# Patient Record
Sex: MALE | Race: WHITE | NOT HISPANIC OR LATINO | Employment: FULL TIME | URBAN - METROPOLITAN AREA
[De-identification: names, ages, dates, MRNs, and addresses within clinical notes are randomized per-mention and may not be internally consistent; named-entity substitution may affect disease eponyms.]

---

## 2017-11-07 ENCOUNTER — OFFICE VISIT (OUTPATIENT)
Dept: MEDICAL GROUP | Facility: PHYSICIAN GROUP | Age: 27
End: 2017-11-07
Payer: COMMERCIAL

## 2017-11-07 VITALS
HEIGHT: 73 IN | SYSTOLIC BLOOD PRESSURE: 116 MMHG | OXYGEN SATURATION: 96 % | WEIGHT: 230 LBS | RESPIRATION RATE: 16 BRPM | BODY MASS INDEX: 30.48 KG/M2 | HEART RATE: 64 BPM | DIASTOLIC BLOOD PRESSURE: 70 MMHG | TEMPERATURE: 98.8 F

## 2017-11-07 DIAGNOSIS — J34.89 NASAL DISCHARGE: ICD-10-CM

## 2017-11-07 DIAGNOSIS — J34.9 SINUS DISEASE: ICD-10-CM

## 2017-11-07 DIAGNOSIS — A63.0 GENITAL WARTS: ICD-10-CM

## 2017-11-07 DIAGNOSIS — F45.8 BRUXISM: ICD-10-CM

## 2017-11-07 DIAGNOSIS — E66.9 OBESITY (BMI 30-39.9): ICD-10-CM

## 2017-11-07 DIAGNOSIS — M25.521 RIGHT ELBOW PAIN: ICD-10-CM

## 2017-11-07 DIAGNOSIS — K64.0 GRADE I HEMORRHOIDS: ICD-10-CM

## 2017-11-07 DIAGNOSIS — L30.9 ECZEMA, UNSPECIFIED TYPE: ICD-10-CM

## 2017-11-07 DIAGNOSIS — F98.8 ATTENTION DEFICIT DISORDER, UNSPECIFIED HYPERACTIVITY PRESENCE: ICD-10-CM

## 2017-11-07 PROCEDURE — 99204 OFFICE O/P NEW MOD 45 MIN: CPT | Performed by: INTERNAL MEDICINE

## 2017-11-07 RX ORDER — CYCLOBENZAPRINE HCL 5 MG
5-10 TABLET ORAL 3 TIMES DAILY PRN
Qty: 30 TAB | Refills: 0 | Status: SHIPPED | OUTPATIENT
Start: 2017-11-07 | End: 2018-04-10 | Stop reason: SDUPTHER

## 2017-11-07 RX ORDER — TRIAMCINOLONE ACETONIDE 1 MG/G
1 CREAM TOPICAL 2 TIMES DAILY
Qty: 1 TUBE | Refills: 0 | Status: SHIPPED | OUTPATIENT
Start: 2017-11-07 | End: 2020-12-14

## 2017-11-07 RX ORDER — CYCLOBENZAPRINE HCL 5 MG
5-10 TABLET ORAL 3 TIMES DAILY PRN
Qty: 30 TAB | Refills: 0 | Status: SHIPPED | OUTPATIENT
Start: 2017-11-07 | End: 2017-11-07 | Stop reason: SDUPTHER

## 2017-11-07 RX ORDER — TRIAMCINOLONE ACETONIDE 1 MG/G
1 CREAM TOPICAL 2 TIMES DAILY
Qty: 1 TUBE | Refills: 0 | Status: SHIPPED | OUTPATIENT
Start: 2017-11-07 | End: 2017-11-07 | Stop reason: SDUPTHER

## 2017-11-07 ASSESSMENT — PATIENT HEALTH QUESTIONNAIRE - PHQ9: CLINICAL INTERPRETATION OF PHQ2 SCORE: 0

## 2017-11-09 NOTE — ASSESSMENT & PLAN NOTE
He has severe teeth ground . He has had tried mouth guard, but still bruxism is very severe. He had tried muscle relaxant and seems that's the only thing helps him. Otherwise he has TMJ tenderness and pain

## 2017-11-09 NOTE — ASSESSMENT & PLAN NOTE
He has sometimes anal itchiness, he has not seen blood in stool. He does not feel bulging hemorrhoids. He had tried H cream and seems to help his symptoms

## 2017-11-09 NOTE — ASSESSMENT & PLAN NOTE
He reports pain on the right elbow, that is going on for one month, radiate lateral tendin. He denies fever. He works at . He works in computer all time. He has no swelling. No trauma. He had tried some ibuprofen. Still has tenderness on palpation, or when he works

## 2017-11-09 NOTE — ASSESSMENT & PLAN NOTE
Pt reports feeling congested, nasal drips, crusty nose most of the time. He has seasonal allergies, but sinus symptoms has been worse since last 6 months. He had used cocaine in large amount, and since then he is having above symptoms. He had tried washing sinus, but seems to not resolve the problem. He denies sore throat, headache, blurry vision, earaches, tinnitus, fever

## 2017-11-09 NOTE — ASSESSMENT & PLAN NOTE
He has had genital warts for long time. He has had cryotherapy. Still he has reoccurrence of lesions. He is asking if there is a medication to complete cure them. I explained that there isn't. He has not had any other STD

## 2017-11-09 NOTE — PROGRESS NOTES
New Patient to Establish    Reason to establish: sinus congested, right elbow pain, muscle relaxant     Miky Khanna is a 26 y.o. male here today for evaluation and management of:    Nasal discharge  High dose of coccaie, since then congested, nasal drips, saline washing.     ADD (attention deficit disorder)  He is on vyvanse. Problems more with focusing. Follows with psychiatry and psychologist.       Sinus disease  Pt reports feeling congested, nasal drips, crusty nose most of the time. He has seasonal allergies, but sinus symptoms has been worse since last 6 months. He had used cocaine in large amount, and since then he is having above symptoms. He had tried washing sinus, but seems to not resolve the problem. He denies sore throat, headache, blurry vision, earaches, tinnitus, fever    Right elbow pain  He reports pain on the right elbow, that is going on for one month, radiate lateral tendin. He denies fever. He works at [employer]. He works in computer all time. He has no swelling. No trauma. He had tried some ibuprofen. Still has tenderness on palpation, or when he works     Obesity (BMI 30-39.9)  He has high muscle rate, he is not obese,     Grade I hemorrhoids  He has sometimes anal itchiness, he has not seen blood in stool. He does not feel bulging hemorrhoids. He had tried H cream and seems to help his symptoms     Genital warts  He has had genital warts for long time. He has had cryotherapy. Still he has reoccurrence of lesions. He is asking if there is a medication to complete cure them. I explained that there isn't. He has not had any other STD     Eczema  He has a papula on left arm, itchy, not irritated. eczema type     Bruxism  He has severe teeth ground . He has had tried mouth guard, but still bruxism is very severe. He had tried muscle relaxant and seems that's the only thing helps him. Otherwise he has TMJ tenderness and pain       Past Medical History:   Diagnosis Date   • ADHD    • Anxiety   "  • Substance abuse        Current Outpatient Prescriptions   Medication Sig Dispense Refill   • cyclobenzaprine (FLEXERIL) 5 MG tablet Take 1-2 Tabs by mouth 3 times a day as needed. 30 Tab 0   • triamcinolone acetonide (KENALOG) 0.1 % Cream Apply 1 Application to affected area(s) 2 times a day. 1 Tube 0     No current facility-administered medications for this visit.        Allergies as of 11/07/2017 - Reviewed 11/07/2017   Allergen Reaction Noted   • Tramadol Shortness of Breath 01/27/2015       Social History     Social History   • Marital status: Unknown     Spouse name: N/A   • Number of children: N/A   • Years of education: N/A     Occupational History   • Not on file.     Social History Main Topics   • Smoking status: Former Smoker     Packs/day: 0.25     Quit date: 2/1/2017   • Smokeless tobacco: Never Used   • Alcohol use Yes      Comment: rare   • Drug use:      Types: Marijuana      Comment: marijuana   • Sexual activity: Yes     Partners: Female      Comment: no STD, two children     Other Topics Concern   • Not on file     Social History Narrative   • No narrative on file       Family History   Problem Relation Age of Onset   • Psychiatry Father      suicide   • Cancer Brother      lung cancer, 43   • Alcohol/Drug Brother      meth, heroine   • Cancer Maternal Grandfather      lung cancer   • Diabetes Neg Hx    • Heart Disease Neg Hx        History reviewed. No pertinent surgical history.    ROS: All systems reviewed are negative except for HPI      /70   Pulse 64   Temp 37.1 °C (98.8 °F)   Resp 16   Ht 1.848 m (6' 0.75\")   Wt 104.3 kg (230 lb)   SpO2 96%   BMI 30.55 kg/m²     Physical Exam  General:  Alert and oriented, No apparent distress.  Eyes: Pupils equal and reactive. No scleral icterus. EOMI  Throat: Clear no erythema or exudates noted. Oral mucosa moist, oral dental intact  Neck: Supple. No cervical or supraclavicular lymphadenopathy noted. Thyroid not enlarged. Tympanic membrane " cleared   Lungs: normal effort,  Clear to auscultation  Cardiovascular: Regular rate and rhythm. No murmurs, rubs or gallops, pulses intact   Abdomen:  Soft, +BS, no tenderness. No rebound or guarding noted. No hepato or splenomegaly   Extremities: No clubbing, cyanosis, edema.  Neuro: cranial nerves intact, sensation intact   Muscle skeletal: no elbow swelling, tenderness on lateral ligaments, range of motion intact    Skin: Clear. No rash or suspicious skin lesions noted      Assessment and Plan    1. Right elbow pain  I advise brace, ibuprofen for 2 weeks, if still persistent, I will refer to sport medicine   - COMP METABOLIC PANEL; Future  - CBC WITH DIFFERENTIAL; Future    2. Nasal discharge  3. Sinus disease  Likely sinus medicamentosa due to cocaine use. There is no indication for antibiotic at this time. Follow up with ENT for further eval  - COMP METABOLIC PANEL; Future  - CBC WITH DIFFERENTIAL; Future  - REFERRAL TO ENT  - COMP METABOLIC PANEL; Future  - CBC WITH DIFFERENTIAL; Future  - REFERRAL TO ENT    3. Attention deficit disorder, unspecified hyperactivity presence  Follow up psychiatrist and psychologist.  - COMP METABOLIC PANEL; Future  - CBC WITH DIFFERENTIAL; Future    4. Genital warts  Cryotherapy at next apt. imiquid if pt is willing to try   - COMP METABOLIC PANEL; Future  - CBC WITH DIFFERENTIAL; Future    5. Grade I hemorrhoids  Well hydration, high-fiber diet, hygiene, hemorrhoid cream prn   - COMP METABOLIC PANEL; Future  - CBC WITH DIFFERENTIAL; Future    6. Eczema, unspecified type  Steroid cream prn   - COMP METABOLIC PANEL; Future  - CBC WITH DIFFERENTIAL; Future  - triamcinolone acetonide (KENALOG) 0.1 % Cream; Apply 1 Application to affected area(s) 2 times a day.  Dispense: 1 Tube; Refill: 0    7. Obesity (BMI 30-39.9)  Eating healthy, exercise. He does not eat healthy   - Patient identified as having weight management issue.  Appropriate orders and counseling given.  - COMP  METABOLIC PANEL; Future  - CBC WITH DIFFERENTIAL; Future  - LIPID PROFILE; Future  - TSH WITH REFLEX TO FT4; Future  - REFERRAL TO [facility] (HIP) Services Requested: Medical Weight Management Program; Reason for Referral? BMI>30; Reason for Visit: Overweight/Obesity    8. Bruxism  Muscle relaxant, follow up with dentist   - COMP METABOLIC PANEL; Future  - CBC WITH DIFFERENTIAL; Future  - cyclobenzaprine (FLEXERIL) 5 MG tablet; Take 1-2 Tabs by mouth 3 times a day as needed.  Dispense: 30 Tab; Refill: 0      Followup: Return in about 4 weeks (around 12/5/2017) for Short.    Signed by: Cailin Serna M.D.

## 2017-11-22 ENCOUNTER — APPOINTMENT (OUTPATIENT)
Dept: HEALTH INFORMATION MANAGEMENT | Facility: MEDICAL CENTER | Age: 27
End: 2017-11-22
Payer: COMMERCIAL

## 2018-04-10 ENCOUNTER — OFFICE VISIT (OUTPATIENT)
Dept: MEDICAL GROUP | Facility: CLINIC | Age: 28
End: 2018-04-10
Payer: COMMERCIAL

## 2018-04-10 VITALS
OXYGEN SATURATION: 94 % | HEART RATE: 88 BPM | SYSTOLIC BLOOD PRESSURE: 118 MMHG | HEIGHT: 72 IN | RESPIRATION RATE: 16 BRPM | BODY MASS INDEX: 32.51 KG/M2 | DIASTOLIC BLOOD PRESSURE: 72 MMHG | WEIGHT: 240 LBS | TEMPERATURE: 96.9 F

## 2018-04-10 DIAGNOSIS — F45.8 BRUXISM: ICD-10-CM

## 2018-04-10 DIAGNOSIS — J40 BRONCHITIS: ICD-10-CM

## 2018-04-10 PROCEDURE — 99213 OFFICE O/P EST LOW 20 MIN: CPT | Performed by: NURSE PRACTITIONER

## 2018-04-10 RX ORDER — CYCLOBENZAPRINE HCL 5 MG
5-10 TABLET ORAL NIGHTLY PRN
Qty: 30 TAB | Refills: 0 | Status: SHIPPED | OUTPATIENT
Start: 2018-04-10 | End: 2020-12-14

## 2018-04-10 RX ORDER — ALBUTEROL SULFATE 90 UG/1
2 AEROSOL, METERED RESPIRATORY (INHALATION) EVERY 6 HOURS PRN
Qty: 8.5 G | Refills: 0 | Status: SHIPPED | OUTPATIENT
Start: 2018-04-10 | End: 2018-04-11 | Stop reason: CLARIF

## 2018-04-10 RX ORDER — AZITHROMYCIN 250 MG/1
TABLET, FILM COATED ORAL
Qty: 6 TAB | Refills: 0 | Status: SHIPPED | OUTPATIENT
Start: 2018-04-10 | End: 2018-12-11

## 2018-04-10 RX ORDER — LISDEXAMFETAMINE DIMESYLATE CAPSULES 70 MG/1
70 CAPSULE ORAL EVERY MORNING
COMMUNITY
End: 2021-04-07

## 2018-04-10 ASSESSMENT — ENCOUNTER SYMPTOMS
COUGH: 1
SPUTUM PRODUCTION: 1
SINUS PAIN: 0
WHEEZING: 1
SORE THROAT: 1
FEVER: 0
DIZZINESS: 1
WEAKNESS: 1
CHILLS: 0
HEADACHES: 0
SHORTNESS OF BREATH: 0
MYALGIAS: 0

## 2018-04-10 NOTE — PROGRESS NOTES
Chief Complaint   Patient presents with   • Cough     occasionally productive cough/ sore throat /chest congestion/ lighheaded/fatigue/chest tightness x 10 days         HISTORY OF PRESENT ILLNESS: Patient is a 27 y.o. male established patient who presents today due to 10 days hx of productive coughing, chest congestion, lightheadedness, fatigue, one day hx of sore throat.     He took one dose of mucinex with no help so that he stops. He is former smoker, quit in 2017. He denied sob but seems to hear a little bit wheezing.       Patient Active Problem List    Diagnosis Date Noted   • Right elbow pain 11/07/2017   • Nasal discharge 11/07/2017   • Grade I hemorrhoids 11/07/2017   • Eczema 11/07/2017   • Obesity (BMI 30-39.9) 11/07/2017   • Bruxism 11/07/2017   • Sinus disease 11/07/2017   • Genital warts 07/16/2015   • ADD (attention deficit disorder) 07/16/2015       Allergies:Tramadol    Current Outpatient Prescriptions   Medication Sig Dispense Refill   • lisdexamfetamine (VYVANSE) 70 MG capsule Take 70 mg by mouth every morning.     • azithromycin (ZITHROMAX) 250 MG Tab As directed 6 Tab 0   • albuterol 108 (90 Base) MCG/ACT Aero Soln inhalation aerosol Inhale 2 Puffs by mouth every 6 hours as needed for Shortness of Breath. 8.5 g 0   • cyclobenzaprine (FLEXERIL) 5 MG tablet Take 1-2 Tabs by mouth at bedtime as needed. 30 Tab 0   • triamcinolone acetonide (KENALOG) 0.1 % Cream Apply 1 Application to affected area(s) 2 times a day. 1 Tube 0     No current facility-administered medications for this visit.        Social History   Substance Use Topics   • Smoking status: Former Smoker     Packs/day: 0.25     Quit date: 2/1/2017   • Smokeless tobacco: Never Used   • Alcohol use Yes      Comment: rare       Family Status   Relation Status   • Father    • Brother    • Maternal Grandfather    • Neg Hx      Family History   Problem Relation Age of Onset   • Psychiatry Father      suicide   • Cancer Brother      lung cancer,  "43   • Alcohol/Drug Brother      meth, heroine   • Cancer Maternal Grandfather      lung cancer   • Diabetes Neg Hx    • Heart Disease Neg Hx          ROS:  Review of Systems   Constitutional: Positive for malaise/fatigue. Negative for chills and fever.   HENT: Positive for sore throat. Negative for congestion, ear pain and sinus pain.    Respiratory: Positive for cough, sputum production and wheezing. Negative for shortness of breath.    Musculoskeletal: Negative for myalgias.   Neurological: Positive for dizziness and weakness. Negative for headaches.        Objective:     Blood pressure 118/72, pulse 88, temperature 36.1 °C (96.9 °F), resp. rate 16, height 1.816 m (5' 11.5\"), weight 108.9 kg (240 lb), SpO2 94 %.     Physical Exam:  Physical Exam   Constitutional: He is oriented to person, place, and time and well-developed, well-nourished, and in no distress.   HENT:   Head: Normocephalic and atraumatic.   Right Ear: External ear normal.   Left Ear: External ear normal.   Nose: Right sinus exhibits no maxillary sinus tenderness and no frontal sinus tenderness. Left sinus exhibits no maxillary sinus tenderness and no frontal sinus tenderness.   Mouth/Throat: No oropharyngeal exudate.   Eyes: Conjunctivae are normal.   Neck: Neck supple. No JVD present.   Cardiovascular: Normal rate and regular rhythm.    Pulmonary/Chest: Effort normal and breath sounds normal. No respiratory distress. He has no wheezes. He has no rales.   Musculoskeletal: Normal range of motion. He exhibits no edema.   Neurological: He is alert and oriented to person, place, and time.   Skin: Skin is warm. No erythema.   Vitals reviewed.        Assessment/Plan:  1. Bronchitis  - azithromycin (ZITHROMAX) 250 MG Tab; As directed  Dispense: 6 Tab; Refill: 0  - albuterol 108 (90 Base) MCG/ACT Aero Soln inhalation aerosol; Inhale 2 Puffs by mouth every 6 hours as needed for Shortness of Breath.  Dispense: 8.5 g; Refill: 0    Pt also requests to refill " flexeril today because he grind and clench his teeth a lot a night time and flexeril has been the medication to help him to relax and sleep better. He usually only takes one per night. He only needs one refill and he will follow up with his PCP for future refill.    Differential diagnoses and indications for immediate follow-up discussed with patient.    Instructed to return to clinic or nearest emergency department for any change in condition, further concerns, or worsening of symptoms.    The patient demonstrated a good understanding and agreed with the treatment plan.

## 2018-04-10 NOTE — PATIENT INSTRUCTIONS

## 2018-04-11 ENCOUNTER — TELEPHONE (OUTPATIENT)
Dept: MEDICAL GROUP | Facility: CLINIC | Age: 28
End: 2018-04-11

## 2018-04-11 DIAGNOSIS — J40 BRONCHITIS: ICD-10-CM

## 2018-04-11 RX ORDER — ALBUTEROL SULFATE 90 UG/1
2 AEROSOL, METERED RESPIRATORY (INHALATION) EVERY 6 HOURS PRN
Qty: 8.5 G | Refills: 3 | Status: SHIPPED | OUTPATIENT
Start: 2018-04-11 | End: 2020-12-14

## 2018-04-11 NOTE — TELEPHONE ENCOUNTER
1. Caller Name: Karen                                         Call Back Number: [phone]      Patient approves a detailed voicemail message: N\A    Pt's ins doesn't cover Proventil HFA.  The preferred drugs are ProAir HFA, ProAirrespiclick, Ventolin HFA

## 2018-07-15 ENCOUNTER — OFFICE VISIT (OUTPATIENT)
Dept: URGENT CARE | Facility: PHYSICIAN GROUP | Age: 28
End: 2018-07-15

## 2018-07-15 VITALS
TEMPERATURE: 96.9 F | WEIGHT: 241 LBS | RESPIRATION RATE: 18 BRPM | OXYGEN SATURATION: 97 % | DIASTOLIC BLOOD PRESSURE: 78 MMHG | BODY MASS INDEX: 32.64 KG/M2 | SYSTOLIC BLOOD PRESSURE: 122 MMHG | HEART RATE: 84 BPM | HEIGHT: 72 IN

## 2018-07-15 DIAGNOSIS — L03.032 CELLULITIS OF TOE OF LEFT FOOT: ICD-10-CM

## 2018-07-15 DIAGNOSIS — L60.0 INGROWING NAIL, LEFT GREAT TOE: ICD-10-CM

## 2018-07-15 PROCEDURE — 11750 EXCISION NAIL&NAIL MATRIX: CPT | Performed by: PHYSICIAN ASSISTANT

## 2018-07-15 RX ORDER — SULFAMETHOXAZOLE AND TRIMETHOPRIM 800; 160 MG/1; MG/1
TABLET ORAL
Qty: 20 TAB | Refills: 0 | Status: SHIPPED | OUTPATIENT
Start: 2018-07-15 | End: 2018-12-11

## 2018-07-15 ASSESSMENT — ENCOUNTER SYMPTOMS
CHILLS: 0
TINGLING: 0
FALLS: 0
COUGH: 0
MYALGIAS: 0
FEVER: 0
DIZZINESS: 0

## 2018-07-15 NOTE — PROGRESS NOTES
"Subjective:      Miky Khanna is a 27 y.o. male who presents with Toe Pain (Lt big toe, poss infection x2 weeks )            Subjective: Patient is a 27-year-old male who comes in with left great toe nail ingrown for the last 2-3 weeks.  Soaks with warm water has helped.  He describes pain, redness, swelling and pus formation.  Tight shoes make it worse.  This has never occurred before.  Denies any numbness or tingling.  Ibuprofen also helps.  It is worse after walking and standing at the end of the day.  It is feeling better in the morning.    Past medical history: Is not pertinent to this examination  Past surgical history: Not pertinent to this examination  Family history: Is not pertinent to this examination  Allergies: Tramadol  Social history: Is reviewed in Epic          Review of Systems   Constitutional: Negative for chills and fever.   Respiratory: Negative for cough.    Cardiovascular: Negative for chest pain.   Musculoskeletal: Negative for falls and myalgias.   Skin: Positive for rash.   Neurological: Negative for dizziness and tingling.          Objective:     /78   Pulse 84   Temp 36.1 °C (96.9 °F)   Resp 18   Ht 1.816 m (5' 11.5\")   Wt 109.3 kg (241 lb)   SpO2 97%   BMI 33.14 kg/m²      Physical Exam   Constitutional: He appears well-developed and well-nourished.   Eyes: EOM are normal. Pupils are equal, round, and reactive to light.   Neck: Normal range of motion.   Musculoskeletal:        Feet:      Musculoskeletal: Full range of motion of the left foot and toes, normal strength against resistance.  The left great toe medial distal aspect of the nail is completely ingrown there is significant erythema, edema and pus formation extending from the medial aspect of the nail down to the cuticle.  Touching it causes discomfort  Neurovascular intact with a 2 second cap refill good distal pulses       Urgent CARE course after consent was obtained and risks explained under sterile condition I " did a digital nerve block and anesthetized the left great toe using approximately 6 cc of lidocaine 1% only.  I used 3 cc on each side..  After excellent anesthetic effect was achieved I cut the 1 6 of the nail on the medial aspect down to the cuticle.  Using hemostat clamps I then clamped the nail and remove the ingrowing portion.  There was very little blood loss.  Approximately 1 cc to 2 cc.  Antibiotic ointment was placed gauze was placed into gauze dressing applied.  Patient tolerated very well     Assessment/Plan:     1. Ingrowing nail, left great toe  Status post partial toenail removal done here in the clinic Wound care instructions given at length.  Of note patient is a cash paying patient.  I discussed at length alternatives and the fact that I was not sure what his bill would be.  Patient was aware that it could exceed multiple 100s and was okay with me proceeding to do the procedure.  Please follow-up if any signs or symptoms of infection occur    - sulfamethoxazole-trimethoprim (BACTRIM DS) 800-160 MG tablet; Take one pill twice a day for ten days  Dispense: 20 Tab; Refill: 0    2. Cellulitis of toe of left foot    - sulfamethoxazole-trimethoprim (BACTRIM DS) 800-160 MG tablet; Take one pill twice a day for ten days  Dispense: 20 Tab; Refill: 0

## 2018-10-07 ENCOUNTER — HOSPITAL ENCOUNTER (EMERGENCY)
Facility: MEDICAL CENTER | Age: 28
End: 2018-10-07
Attending: EMERGENCY MEDICINE

## 2018-10-07 VITALS
OXYGEN SATURATION: 94 % | DIASTOLIC BLOOD PRESSURE: 81 MMHG | BODY MASS INDEX: 28.49 KG/M2 | SYSTOLIC BLOOD PRESSURE: 120 MMHG | TEMPERATURE: 97.4 F | HEART RATE: 93 BPM | HEIGHT: 73 IN | WEIGHT: 215 LBS | RESPIRATION RATE: 16 BRPM

## 2018-10-07 DIAGNOSIS — F43.21 SITUATIONAL DEPRESSION: ICD-10-CM

## 2018-10-07 LAB
POC BREATHALIZER: 0.08 PERCENT (ref 0–0.01)
POC BREATHALIZER: 0.12 PERCENT (ref 0–0.01)

## 2018-10-07 PROCEDURE — 302970 POC BREATHALIZER: Performed by: EMERGENCY MEDICINE

## 2018-10-07 PROCEDURE — 90791 PSYCH DIAGNOSTIC EVALUATION: CPT

## 2018-10-07 PROCEDURE — 99285 EMERGENCY DEPT VISIT HI MDM: CPT

## 2018-10-07 ASSESSMENT — PAIN SCALES - GENERAL
PAINLEVEL_OUTOF10: 0
PAINLEVEL_OUTOF10: 0

## 2018-10-07 NOTE — ED PROVIDER NOTES
ED Provider Note    Scribed for Scottie Petersen M.D. by Chrissy Parkinson. 10/7/2018  5:58 AM    Primary care provider: Cailin Serna M.D.  Means of arrival: EMS  History obtained from: patient   History limited by: none     CHIEF COMPLAINT  Chief Complaint   Patient presents with   • Suicidal Ideation       SAHARA Khanna is a 27 y.o. male with a history of ADHD, anxiety, and substance abuse who presents to the Emergency Department via EMS for evaluation of suicidal ideation today. Patient denies associated symptoms. Per nurse, the patient drank a half a bottle alcohol last night and was found to be laying in the yard by EMS PD. He states that he drinks occasionally and does not normally drink that much. Patient did tell EMS PD that he was having suicidal ideations this morning.  Patient states he recently  has also had recent trouble with his girlfriend which is likely exacerbating his symptoms. He has a history of a previous suicide attempt and has access to a firearm. He reports his father committed suicide several years ago. Patient endorses to occasional marijuana use and states he smokes 0.25 packs of cigarettes daily. No other acute medical complaints or concerns.     REVIEW OF SYSTEMS  Pertinent positives include suicidal ideations. All other systems reviewed and negative.    PAST MEDICAL HISTORY   has a past medical history of ADHD; Anxiety; and Substance abuse (HCC).    SURGICAL HISTORY  patient denies any surgical history    SOCIAL HISTORY  Social History   Substance Use Topics   • Smoking status: Former Smoker     Packs/day: 0.25     Quit date: 2/1/2017   • Smokeless tobacco: Never Used   • Alcohol use Yes      History   Drug Use   • Types: Marijuana     Comment: marijuana       FAMILY HISTORY  Family History   Problem Relation Age of Onset   • Psychiatry Father         suicide   • Cancer Brother         lung cancer, 43   • Alcohol/Drug Brother         meth, heroine   • Cancer Maternal Grandfather          "lung cancer   • Diabetes Neg Hx    • Heart Disease Neg Hx        CURRENT MEDICATIONS  Current medications can be reviewed in the nurse's note.     ALLERGIES  Allergies   Allergen Reactions   • Tramadol Shortness of Breath     Acid refulx       PHYSICAL EXAM  VITAL SIGNS: /80   Pulse 100   Temp 36.8 °C (98.2 °F)   Resp 18   Ht 1.854 m (6' 1\")   Wt 97.5 kg (215 lb)   BMI 28.37 kg/m²     Vital signs reviewed.  Constitutional:  Slightly unkempt. Smells of alcohol. Patient is slurring his words on exam.   Head: Normocephalic.   Mouth/Throat: Oropharynx is clear and moist.   Eyes: EOM are normal. Pupils are equal, round, and reactive to light.   Cardiovascular: Normal rate, regular rhythm and normal heart sounds.    Pulmonary/Chest: Effort normal and breath sounds normal. No wheezes.   Abdominal: Soft. There is no tenderness. There is no rebound and no guarding.   Musculoskeletal: Exhibits no edema.   Neurological: Patient is alert and oriented to person, place, and time. CNs II - XII intact. DTRs intact. Normal sensation and strength. Patient is slurring his words on exam.   Skin: Skin is warm and dry.   Psychiatric: Agitated.     LABS  Results for orders placed or performed during the hospital encounter of 10/07/18   POC BREATHALIZER   Result Value Ref Range    POC Breathalizer 0.122 (A) 0.00 - 0.01 Percent   POC BREATHALIZER   Result Value Ref Range    POC Breathalizer 0.078 (A) 0.00 - 0.01 Percent     All labs reviewed by me.    COURSE & MEDICAL DECISION MAKING  Pertinent Labs & Imaging studies reviewed. (See chart for details) The patient's [facility] and past medical  records were reviewed    5:58 AM - Patient seen and examined at bedside. Ordered urine drug screen, poc breathalyzer to evaluate his symptoms. The differential diagnoses include but are not limited to: alcohol intoxication vs suicidal ideation. Informed patient I will have life skills consult with him once he is clinically sober. He " understands.     8:31 AM- Reviewed the patient's lab results. Patient's DUY level is 0.122.     8:42 AM- Updated by nurse the patient has been pacing in his room and has been acting in a threatening manner towards staff.  Patient was initially quite agitated.  He then slept for 2 hours.  He was seen by life skills.      12:16 PM- Discussed with life skills again. The patient is more level headed and states he will reach out and get outpatient physiatric care. Per life skills, patient's mother is at his home and his firearm was removed. We agree the patient is now stable for discharge.     The patient will return for new or worsening symptoms and is stable at the time of discharge.    DISPOSITION:  Patient will be discharged home in stable condition.    FOLLOW UP:  Cailin Serna M.D.  [address]  [address] [address]  [phone]    In 1 week      FINAL IMPRESSION  1. Situational depression       Chrissy THRASHER (Darcie), am scribing for, and in the presence of, Scottie Petersen M.D..    Electronically signed by: Chrissy Parkinson (Darcie), 10/7/2018    Scottie THRASHER M.D. personally performed the services described in this documentation, as scribed by Chrissy Parkinson in my presence, and it is both accurate and complete. C.     The note accurately reflects work and decisions made by me.  Scottie Petersen  10/7/2018  12:58 PM
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
## 2018-10-07 NOTE — ED NOTES
Pt provided with discharge instructions, prescriptions, instructions for follow up appointment with PCP, s/s of when to seek emergency care.  Pt verbalizes understanding.  Pt discharged in good condition with Mother.

## 2018-10-07 NOTE — DISCHARGE PLANNING
Alert Team Note: Received referral for behavioral health consultation from Bedside RN. Patient observed attempting to walk around hospital room with an unsteady gait. Asked patient to return to bed for safety, patient refused. Currently patient is intoxicated. Will wait until patient is legally sober to conduct a behavioral health assessment.  Will continue to follow.    Sanna Kerr, Ph.D.  Alert Team Therapist

## 2018-10-07 NOTE — ED NOTES
"Pt refusing to provide UA sample when in bathroom. Pt states, \"I do not consent to this and unless a  tells me too I am not doing it.\"  "

## 2018-10-07 NOTE — DISCHARGE PLANNING
"Alert Team:  Patient reassessed after decreased alcohol levels.  Patient has been calm and cooperative with staff.  Denies any SI or HI and states that he understands that his behaviors while he was intoxicated were around the increased stress he has in his life.  I don't drink alcohol very often and \"I knew that I shouldn't have last night cause of the situation with girlfriend.  Mother is present upon discharge and is a positive support person.  Also has locked up the gun where the patient can not get it.  Again patient denies SI and HI states \"I just want to get back to work and than go down to [place] and get my daughter.  Bio Mom and pt. Have joint custody. Patient will follow up with EPS at [facility] next work.  Understands that he needs to work through issues around loss of father and anger issues. DC with Mother.    "

## 2018-10-07 NOTE — ED TRIAGE NOTES
BIB EMS reporting depression and SI. Per EMS PD was called to the scene where the pt was found laying in a yard. PD reports pt's stating he is depressed and suicidal. Pt has had a previous suicide attempt and has access to a firearm and PD was concerned for safety of pt and placed pt on a legal hold. Pt is + for ETOH.

## 2018-10-07 NOTE — ED NOTES
After Visit Summary   11/16/2017    Izabella Og    MRN: [id number]           Patient Information     Date Of Birth          1960        Visit Information        Provider Department      11/16/2017 10:00 AM [place] [facility]        Today's Diagnoses     Dehydration    -  1       Follow-ups after your visit        Your next 10 appointments already scheduled     Nov 20, 2017 10:40 AM CST   LAB with LAB FIRST FLOOR [facility])    [address] [facility] [address]   [phone]           Please do not eat 10-12 hours before your appointment if you are coming in fasting for labs on lipids, cholesterol, or glucose (sugar). This does not apply to pregnant women. Water, hot tea and black coffee (with nothing added) are okay. Do not drink other fluids, diet soda or chew gum.            Nov 24, 2017 11:30 AM CST   Level 4 with [address] ([facility])    [address] [facility] [address]   [phone]            Nov 27, 2017  7:30 AM CST   LAB with LAB FIRST FLOOR [facility] ([facility])    [address] [facility] [address]   [phone]           Please do not eat 10-12 hours before your appointment if you are coming in fasting for labs on lipids, cholesterol, or glucose (sugar). This does not apply to pregnant women. Water, hot tea and black coffee (with nothing added) are okay. Do not drink other fluids, diet soda or chew gum.            Nov 27, 2017  8:00 AM CST   Return Visit with Adria De La Torre MD   [facility])    [address] [facility] [address]   [phone]            Nov 27, 2017 10:40 AM CST   LAB with LAB FIRST FLOOR [facility] ([facility])    [address] [place] [address]  Room cleared of all dangerous items and pt placed in hospital gown only. Security searched belongings and placed in locker 10, no items for safe keeping.   [facility] [address]   [phone]           Please do not eat 10-12 hours before your appointment if you are coming in fasting for labs on lipids, cholesterol, or glucose (sugar). This does not apply to pregnant women. Water, hot tea and black coffee (with nothing added) are okay. Do not drink other fluids, diet soda or chew gum.            Dec 04, 2017 10:20 AM CST   LAB with LAB FIRST FLOOR [facility] ([facility])    [address] [place] [address]   [phone]           Please do not eat 10-12 hours before your appointment if you are coming in fasting for labs on lipids, cholesterol, or glucose (sugar). This does not apply to pregnant women. Water, hot tea and black coffee (with nothing added) are okay. Do not drink other fluids, diet soda or chew gum.            Dec 11, 2017 10:20 AM CST   LAB with LAB FIRST FLOOR [facility])    [address] [place] [address]   [phone]           Please do not eat 10-12 hours before your appointment if you are coming in fasting for labs on lipids, cholesterol, or glucose (sugar). This does not apply to pregnant women. Water, hot tea and black coffee (with nothing added) are okay. Do not drink other fluids, diet soda or chew gum.            Dec 18, 2017  1:00 PM CST   LAB with LAB FIRST FLOOR [facility] ([facility])    [address] [place] [address]   [phone]           Please do not eat 10-12 hours before your appointment if you are coming in fasting for labs on lipids, cholesterol, or glucose (sugar). This does not apply to pregnant women. Water, hot tea and black coffee (with nothing added) are okay. Do not drink other fluids, diet soda or chew gum.            Dec 18, 2017  1:30 PM CST   Return Visit with Adria De La Torre MD   [facility]  [facility])    [address] [place] [address]   [phone]            Mar 08, 2018 12:00 PM CST   (Arrive by 11:45 AM)   RETURN ARRHYTHMIA with William Preciado MD   [facility] ([facility])    [address] [facility]  [address]rd [facility] [id number] [phone]              Who to contact     If you have questions or need follow up information about today's clinic visit or your schedule please contact [facility] directly at [phone].  Normal or non-critical lab and imaging results will be communicated to you by MyChart, letter or phone within 4 business days after the clinic has received the results. If you do not hear from us within 7 days, please contact the clinic through [employer] or phone. If you have a critical or abnormal lab result, we will notify you by phone as soon as possible.  Submit refill requests through [employer] or call your pharmacy and they will forward the refill request to us. Please allow 3 business days for your refill to be completed.          Additional Information About Your Visit        MyChart Information     [employer] gives you secure access to your electronic health record. If you see a primary care provider, you can also send messages to your care team and make appointments. If you have questions, please call your primary care clinic.  If you do not have a primary care provider, please call [phone] and they will assist you.      [employer] is an electronic gateway that provides easy, online access to your medical records. With [employer], you can request a clinic appointment, read your test results, renew a prescription or communicate with your care team.     To access your existing account, please contact your [facility] or call [phone] for assistance.        Care EveryWhere ID     This is your Care EveryWhere ID. This could be used by other organizations to access  your [place] medical records  [id number]        Your Vitals Were     Pulse Temperature Respirations Pulse Oximetry          79 98.9  F (37.2  C) (Oral) 18 100%         Blood Pressure from Last 3 Encounters:   11/16/17 144/82   11/13/17 122/68   11/06/17 122/64    Weight from Last 3 Encounters:   11/13/17 84.1 kg (185 lb 6.4 oz)   11/06/17 85 kg (187 lb 4.8 oz)   10/24/17 85.3 kg (188 lb)              We Performed the Following     Basic metabolic panel          Today's Medication Changes          These changes are accurate as of: 11/16/17  3:18 PM.  If you have any questions, ask your nurse or doctor.               These medicines have changed or have updated prescriptions.        Dose/Directions    zinc sulfate 220 (50 ZN) MG capsule   Commonly known as:  ZINCATE   This may have changed:    - when to take this  - reasons to take this   Used for:  S/P gastric bypass        Dose:  220 mg   Take 1 capsule (220 mg) by mouth daily   Refills:  0                Primary Care Provider Office Phone # Fax #    Melani Christi Curry [phone]       [address]  [place] [address]        Equal Access to Services     JIMMY CAPELLAN AH: Hadii amalia overton hadasho Soomaali, waaxda luqadaha, qaybta kaalmada adeegyada, walt ramesh. So [facility] [phone].    ATENCIÓN: Si habla español, tiene a rodriguez disposición servicios gratuitos de asistencia lingüística. Llame al [phone].    We comply with applicable federal civil rights laws and [place] laws. We do not discriminate on the basis of race, color, national origin, age, disability, sex, sexual orientation, or gender identity.            Thank you!     Thank you for choosing [facility]  for your care. Our goal is always to provide you with excellent care. Hearing back from our patients is one way we can continue to improve our services. Please take a few minutes to complete the written survey that you  may receive in the mail after your visit with us. Thank you!             Your Updated Medication List - Protect others around you: Learn how to safely use, store and throw away your medicines at www.disposemymeds.org.          This list is accurate as of: 11/16/17  3:18 PM.  Always use your most recent med list.                   Brand Name Dispense Instructions for use Diagnosis    * ACE/ARB/ARNI NOT PRESCRIBED (INTENTIONAL)      by Other route continuous prn.        acetaminophen 325 MG tablet    TYLENOL     Take 325-650 mg by mouth every 6 hours as needed.        * albuterol 108 (90 BASE) MCG/ACT Inhaler    PROAIR HFA/PROVENTIL HFA/VENTOLIN HFA    1 Inhaler    Inhale 2 puffs into the lungs every 4 hours as needed for shortness of breath / dyspnea or wheezing    Cough       * albuterol (2.5 MG/3ML) 0.083% neb solution     60 vial    Take 1 vial (2.5 mg) by nebulization every 6 hours as needed for shortness of breath / dyspnea or wheezing    Cough       * ASPIRIN NOT PRESCRIBED    INTENTIONAL     by Other route continuous prn Reported on 3/20/2017        B-D ULTRA-FINE 33 LANCETS Misc     200 each    1 Stick by In Vitro route 2 times daily    Type 2 diabetes mellitus with diabetic polyneuropathy, unspecified long term insulin use status (H)       bevacizumab 25 MG/ML intra-lesional    AVASTIN     25 mg by Intra-Lesional route once        blood glucose monitoring meter device kit    no brand specified    1 kit    Use to test blood sugar 2 times daily or as directed.    Type 2 diabetes mellitus with diabetic polyneuropathy, unspecified long term insulin use status (H)       blood glucose monitoring test strip    no brand specified    200 strip    Use to test blood sugars 2 times daily or as directed    Type 2 diabetes mellitus with diabetic polyneuropathy, unspecified long term insulin use status (H)       Calcium carb-Vitamin D 500 mg [place]-200 units 500-200 MG-UNIT per tablet    OSCAL with D;Oyster Shell Calcium     180 tablet    TAKE 1 TABLET BY MOUTH 2 TIMES DAILY    S/P gastric bypass, Secondary renal hyperparathyroidism (H)       calcium gluconate 500 MG Tabs tablet      Take 1,200 mg by mouth daily Reported on 4/27/2017        cetirizine 10 MG tablet    zyrTEC    90 tablet    TAKE 1 TABLET (10 MG) BY MOUTH DAILY    Hives       cyanocobalamin 1000 MCG/ML injection    VITAMIN B12    1 mL    INJECT 1ML INTRAMUSCULARY ONCE EVERY 30 DAYS    Bariatric surgery status       diclofenac 0.1 % ophthalmic solution    VOLTAREN    5 mL    Place 1 drop into both eyes 4 times daily.    Background diabetic retinopathy(362.01)       diphenhydrAMINE 25 MG capsule    BENADRYL    56 capsule    Take 1 capsule (25 mg) by mouth nightly as needed for itching    Nausea       estradiol 0.1 MG/GM cream    ESTRACE VAGINAL    42.5 g    Place 2 g vaginally twice a week After using daily for 2 weeks.    Atrophic vaginitis       famotidine 20 MG tablet    PEPCID    60 tablet    TAKE 1 TAB BY MOUTH 2X DAILY    Adenocarcinoma of transverse colon (H)       fludrocortisone 0.1 MG tablet    FLORINEF    180 tablet    Take 2 tablets (0.2 mg) by mouth daily    Orthostatic hypotension       fluticasone 50 MCG/ACT spray    FLONASE    1 Bottle    Spray 1-2 sprays into both nostrils daily    Chronic rhinitis       gabapentin 600 MG tablet    NEURONTIN    270 tablet    Take 1 tablet (600 mg) by mouth 3 times daily    Diabetic polyneuropathy associated with type 2 diabetes mellitus (H)       GLUCAGON EMERGENCY KIT 1 MG Kit      USE AS DIRECTED FOR SEVERE LOW BG        hydroquinone 4 % Crea     45 g    Apply to the dark spots twice daily.    Hyperpigmentation       hydrOXYzine 25 MG tablet    ATARAX     Take 25 mg by mouth every 6 hours as needed        KETO-DIASTIX Strp      CK URINE FOR KERTONES IF BG IS >240        ketoconazole 2 % cream    NIZORAL    120 g    APPLY TO FLAKY AREAS OF FACE, CHEST, AND BACK TWO TIMES A DAY    Seborrheic dermatitis        lidocaine-prilocaine cream    EMLA     Apply  topically as needed.        loperamide 1 MG/5ML liquid    IMODIUM     Take 2 mg by mouth        meclizine 12.5 MG tablet    ANTIVERT    90 tablet    Take 1 tablet (12.5 mg) by mouth 3 times daily    Dizziness       midodrine 5 MG tablet    PROAMATINE    270 tablet    Take 1 tablet (5 mg) by mouth 2 times daily    Orthostatic hypotension       ondansetron 4 MG ODT tab    ZOFRAN-ODT    120 tablet    Take 1 tablet (4 mg) by mouth every 6 hours as needed for nausea    Nausea       * STATIN NOT PRESCRIBED (INTENTIONAL)      by Other route continuous prn.        thiamine 100 MG tablet     90 tablet    TAKE 1 TABLET BY MOUTH DAILY    Bariatric surgery status       * triamcinolone 0.1 % lotion    KENALOG    120 mL    APPLY SPARINGLY TO AFFECTED AREA THREE TIMES DAILY AS NEEDED.    Hives       * triamcinolone 0.5 % cream    KENALOG    90 g    Apply sparingly to affected area three times daily.    Seborrheic dermatitis       vitamin A 91367 UNIT capsule     90 capsule    Take 1 capsule (10,000 Units) by mouth daily    S/P gastric bypass       VITAMIN D (CHOLECALCIFEROL) PO      Take 2,000 Units by mouth daily        vitamin D 46178 UNIT capsule    ERGOCALCIFEROL    24 capsule    TAKE ONE CAPSULE BY MOUTH EVERY MONDAY AND THURSDAY    Hypovitaminosis D       zinc sulfate 220 (50 ZN) MG capsule    ZINCATE     Take 1 capsule (220 mg) by mouth daily    S/P gastric bypass       * Notice:  This list has 7 medication(s) that are the same as other medications prescribed for you. Read the directions carefully, and ask your doctor or other care provider to review them with you.

## 2018-12-11 ENCOUNTER — OFFICE VISIT (OUTPATIENT)
Dept: URGENT CARE | Facility: PHYSICIAN GROUP | Age: 28
End: 2018-12-11
Payer: COMMERCIAL

## 2018-12-11 VITALS
BODY MASS INDEX: 31.81 KG/M2 | HEIGHT: 73 IN | DIASTOLIC BLOOD PRESSURE: 66 MMHG | SYSTOLIC BLOOD PRESSURE: 112 MMHG | TEMPERATURE: 98.1 F | OXYGEN SATURATION: 94 % | HEART RATE: 99 BPM | WEIGHT: 240 LBS

## 2018-12-11 DIAGNOSIS — L60.0 INGROWN TOENAIL OF LEFT FOOT WITH INFECTION: Primary | ICD-10-CM

## 2018-12-11 DIAGNOSIS — R21 RASH AND NONSPECIFIC SKIN ERUPTION: ICD-10-CM

## 2018-12-11 PROCEDURE — 99214 OFFICE O/P EST MOD 30 MIN: CPT | Performed by: PHYSICIAN ASSISTANT

## 2018-12-11 RX ORDER — SULFAMETHOXAZOLE AND TRIMETHOPRIM 800; 160 MG/1; MG/1
1 TABLET ORAL 2 TIMES DAILY
Qty: 20 TAB | Refills: 0 | Status: SHIPPED | OUTPATIENT
Start: 2018-12-11 | End: 2018-12-21

## 2018-12-11 NOTE — PROGRESS NOTES
Subjective:      Miky Khanna is a 27 y.o. male who presents with Rash (Red, painful and itchy rash on both ankles x 1 wk )    PMH:  has a past medical history of ADHD; Anxiety; and Substance abuse (HCC).  MEDS:   Current Outpatient Prescriptions:   •  sulfamethoxazole-trimethoprim (BACTRIM DS) 800-160 MG tablet, Take 1 Tab by mouth 2 times a day for 10 days., Disp: 20 Tab, Rfl: 0  •  lisdexamfetamine (VYVANSE) 70 MG capsule, Take 70 mg by mouth every morning., Disp: , Rfl:   •  albuterol 108 (90 Base) MCG/ACT Aero Soln inhalation aerosol, Inhale 2 Puffs by mouth every 6 hours as needed for Shortness of Breath. (Patient not taking: Reported on 12/11/2018), Disp: 8.5 g, Rfl: 3  •  cyclobenzaprine (FLEXERIL) 5 MG tablet, Take 1-2 Tabs by mouth at bedtime as needed. (Patient not taking: Reported on 12/11/2018), Disp: 30 Tab, Rfl: 0  •  triamcinolone acetonide (KENALOG) 0.1 % Cream, Apply 1 Application to affected area(s) 2 times a day. (Patient not taking: Reported on 12/11/2018), Disp: 1 Tube, Rfl: 0  ALLERGIES:   Allergies   Allergen Reactions   • Tramadol Shortness of Breath     Acid refulx     SURGHX: History reviewed. No pertinent surgical history.  SOCHX:  reports that he quit smoking about 22 months ago. He smoked 0.25 packs per day. He has never used smokeless tobacco. He reports that he drinks alcohol. He reports that he uses drugs, including Marijuana.  FH: Reviewed with patient, not pertinent to this visit.           Patient presents with:  Rash: Red, painful and itchy rash on both ankles x 1 wk . Pt has had this rash on his ankles on and off for years.  Would like a referral to dermatology.      Pt also has ingrown toenail of left great toenail for several days, draining and sore, swollen.              Rash   This is a new problem. The current episode started in the past 7 days. The problem has been gradually worsening since onset. The affected locations include the left ankle, right ankle, right lower leg  "and left lower leg. The rash is characterized by dryness, itchiness and redness. He was exposed to nothing. Pertinent negatives include no fever or joint pain. Past treatments include moisturizer. The treatment provided no relief. His past medical history is significant for eczema.       Review of Systems   Constitutional: Negative for fever.   Musculoskeletal: Negative for joint pain.   Skin: Positive for itching and rash.   Neurological: Negative for tingling, sensory change and focal weakness.   All other systems reviewed and are negative.         Objective:     /66 (BP Location: Right arm, Patient Position: Sitting, BP Cuff Size: Large adult)   Pulse 99   Temp 36.7 °C (98.1 °F) (Temporal)   Ht 1.854 m (6' 1\")   Wt 108.9 kg (240 lb)   SpO2 94%   BMI 31.66 kg/m²      Physical Exam   Constitutional: He is oriented to person, place, and time. He appears well-developed and well-nourished. No distress.   HENT:   Head: Normocephalic and atraumatic.   Nose: Nose normal.   Eyes: Pupils are equal, round, and reactive to light. Conjunctivae and EOM are normal.   Neck: Normal range of motion.   Cardiovascular: Normal rate.    Pulmonary/Chest: Effort normal.   Musculoskeletal: Normal range of motion.        Left foot: There is tenderness and swelling. There is normal range of motion, no bony tenderness, normal capillary refill, no crepitus, no deformity and no laceration.        Feet:    Neurological: He is alert and oriented to person, place, and time. Coordination and gait normal.   Skin: Skin is warm and dry. Rash noted.        Psychiatric: He has a normal mood and affect.   Nursing note and vitals reviewed.              Assessment/Plan:     1. Ingrown toenail of left foot with infection  sulfamethoxazole-trimethoprim (BACTRIM DS) 800-160 MG tablet    REFERRAL TO DERMATOLOGY   2. Rash and nonspecific skin eruption  sulfamethoxazole-trimethoprim (BACTRIM DS) 800-160 MG tablet    REFERRAL TO DERMATOLOGY     PT " advised saltwater gargles/swishes  3-4 times daily until symptoms improve.     Referral placed to dermatology.     PT should follow up with PCP in 1-2 days for re-evaluation if symptoms have not improved.  Discussed red flags and reasons to return to UC or ED.  Pt and/or family verbalized understanding of diagnosis and follow up instructions and was offered informational handout on diagnosis.  PT discharged.

## 2018-12-11 NOTE — LETTER
December 11, 2018         Patient: Miky Khanna   YOB: 1990   Date of Visit: 12/11/2018           To Whom it May Concern:    Miky Khanna was seen in my clinic on 12/11/2018. He may return to work, but he needs light duty if possible for the next 7 shifts.     If you have any questions or concerns, please don't hesitate to call.        Sincerely,           Kimberly Montoya P.A.-C.  Electronically Signed

## 2018-12-17 ASSESSMENT — ENCOUNTER SYMPTOMS
FOCAL WEAKNESS: 0
FEVER: 0
TINGLING: 0
SENSORY CHANGE: 0

## 2019-01-15 ENCOUNTER — APPOINTMENT (RX ONLY)
Dept: URBAN - METROPOLITAN AREA CLINIC 22 | Facility: CLINIC | Age: 29
Setting detail: DERMATOLOGY
End: 2019-01-15

## 2019-10-02 ENCOUNTER — APPOINTMENT (OUTPATIENT)
Dept: URGENT CARE | Facility: PHYSICIAN GROUP | Age: 29
End: 2019-10-02

## 2020-03-16 ENCOUNTER — HOSPITAL ENCOUNTER (OUTPATIENT)
Facility: MEDICAL CENTER | Age: 30
End: 2020-03-16
Attending: NURSE PRACTITIONER
Payer: COMMERCIAL

## 2020-03-16 ENCOUNTER — TELEPHONE (OUTPATIENT)
Dept: MEDSURG UNIT | Facility: MEDICAL CENTER | Age: 30
End: 2020-03-16

## 2020-03-16 ENCOUNTER — OFFICE VISIT (OUTPATIENT)
Dept: URGENT CARE | Facility: CLINIC | Age: 30
End: 2020-03-16
Payer: COMMERCIAL

## 2020-03-16 VITALS — HEART RATE: 77 BPM | OXYGEN SATURATION: 96 % | TEMPERATURE: 97.9 F

## 2020-03-16 DIAGNOSIS — Z78.9 HISTORY OF RECENT TRAVEL: ICD-10-CM

## 2020-03-16 DIAGNOSIS — R06.02 SHORTNESS OF BREATH: ICD-10-CM

## 2020-03-16 DIAGNOSIS — J02.9 PHARYNGITIS, UNSPECIFIED ETIOLOGY: ICD-10-CM

## 2020-03-16 LAB
FLUAV RNA SPEC QL NAA+PROBE: NEGATIVE
FLUAV+FLUBV AG SPEC QL IA: NEGATIVE
FLUBV RNA SPEC QL NAA+PROBE: NEGATIVE
INT CON NEG: NORMAL
INT CON NEG: NORMAL
INT CON POS: NORMAL
INT CON POS: NORMAL
S PYO AG THROAT QL: NEGATIVE

## 2020-03-16 PROCEDURE — 99213 OFFICE O/P EST LOW 20 MIN: CPT | Performed by: NURSE PRACTITIONER

## 2020-03-16 PROCEDURE — 87633 RESP VIRUS 12-25 TARGETS: CPT

## 2020-03-16 PROCEDURE — 87880 STREP A ASSAY W/OPTIC: CPT | Performed by: NURSE PRACTITIONER

## 2020-03-16 PROCEDURE — 87581 M.PNEUMON DNA AMP PROBE: CPT

## 2020-03-16 PROCEDURE — 87502 INFLUENZA DNA AMP PROBE: CPT

## 2020-03-16 PROCEDURE — 87486 CHLMYD PNEUM DNA AMP PROBE: CPT

## 2020-03-16 PROCEDURE — U0002 COVID-19 LAB TEST NON-CDC: HCPCS

## 2020-03-16 PROCEDURE — 87804 INFLUENZA ASSAY W/OPTIC: CPT | Performed by: NURSE PRACTITIONER

## 2020-03-16 ASSESSMENT — ENCOUNTER SYMPTOMS
HEARTBURN: 0
PND: 0
FEVER: 0
ORTHOPNEA: 0
SPUTUM PRODUCTION: 0
SINUS PAIN: 0
ABDOMINAL PAIN: 0
DIZZINESS: 0
SHORTNESS OF BREATH: 1
SORE THROAT: 1
CHILLS: 0
VOMITING: 0
HEADACHES: 0
WHEEZING: 0
NAUSEA: 0
PALPITATIONS: 0
COUGH: 0

## 2020-03-16 ASSESSMENT — COPD QUESTIONNAIRES: COPD: 0

## 2020-03-16 NOTE — PROGRESS NOTES
Subjective:   Miky Khanna  is a 29 y.o. male who presents for No chief complaint on file.        Shortness of Breath   This is a new problem. The current episode started yesterday. The problem occurs intermittently. The problem has been unchanged. Associated symptoms include a sore throat. Pertinent negatives include no abdominal pain, chest pain, fever, headaches, orthopnea, PND, rash, sputum production, vomiting or wheezing. Nothing aggravates the symptoms. Associated symptoms comments: Patient reports urgent care for new problem.  States he traveled to [place] 3 days ago and developed shortness of breath as well as a sore throat 1 day ago.  Patient has not taken anything over-the-counter for symptoms but does admit that when he gets sick he usually gets short of breath due to a smoking history.  Patient also admits to recent exposure with a Covid 19 patient that was tested back on March 11 at work. Risk factors include smoking. He has tried nothing for the symptoms. There is no history of asthma or COPD.     Review of Systems   Constitutional: Negative for chills, fever and malaise/fatigue.   HENT: Positive for sore throat. Negative for congestion and sinus pain.    Respiratory: Positive for shortness of breath. Negative for cough, sputum production and wheezing.    Cardiovascular: Negative for chest pain, palpitations, orthopnea and PND.   Gastrointestinal: Negative for abdominal pain, heartburn, nausea and vomiting.   Skin: Negative for itching and rash.   Neurological: Negative for dizziness and headaches.     Past Medical History:   Diagnosis Date   • ADHD    • Anxiety    • Substance abuse (HCC)     No past surgical history on file.   Social History     Socioeconomic History   • Marital status: Single     Spouse name: Not on file   • Number of children: Not on file   • Years of education: Not on file   • Highest education level: Not on file   Occupational History   • Not on file   Social Needs   •  Financial resource strain: Not on file   • Food insecurity     Worry: Not on file     Inability: Not on file   • Transportation needs     Medical: Not on file     Non-medical: Not on file   Tobacco Use   • Smoking status: Former Smoker     Packs/day: 0.25     Last attempt to quit: 2/1/2017     Years since quitting: 3.1   • Smokeless tobacco: Never Used   Substance and Sexual Activity   • Alcohol use: Yes   • Drug use: Yes     Types: Marijuana     Comment: marijuana   • Sexual activity: Yes     Partners: Female     Comment: no STD, two children   Lifestyle   • Physical activity     Days per week: Not on file     Minutes per session: Not on file   • Stress: Not on file   Relationships   • Social connections     Talks on phone: Not on file     Gets together: Not on file     Attends [employer] service: Not on file     Active member of club or organization: Not on file     Attends meetings of clubs or organizations: Not on file     Relationship status: Not on file   • Intimate partner violence     Fear of current or ex partner: Not on file     Emotionally abused: Not on file     Physically abused: Not on file     Forced sexual activity: Not on file   Other Topics Concern   • Not on file   Social History Narrative   • Not on file    Tramadol       Objective:   There were no vitals taken for this visit.  Physical Exam  Vitals signs reviewed.   Constitutional:       Appearance: Normal appearance. He is not ill-appearing.   HENT:      Head: Normocephalic and atraumatic.      Nose: No rhinorrhea.      Mouth/Throat:      Lips: Pink.      Mouth: Mucous membranes are moist.      Pharynx: Uvula midline. Posterior oropharyngeal erythema present.   Eyes:      Extraocular Movements: Extraocular movements intact.      Conjunctiva/sclera: Conjunctivae normal.   Cardiovascular:      Rate and Rhythm: Normal rate and regular rhythm.   Pulmonary:      Effort: Pulmonary effort is normal.      Breath sounds: Normal breath sounds.   Skin:      General: Skin is warm.   Neurological:      Mental Status: He is alert and oriented to person, place, and time.   Psychiatric:         Attention and Perception: Attention and perception normal.         Mood and Affect: Mood normal.         Speech: Speech normal.         Behavior: Behavior normal.         Thought Content: Thought content normal.         Cognition and Memory: Cognition normal.         Judgment: Judgment normal.           Assessment/Plan:     1. History of recent travel  - POCT Influenza A/B - negative  - Influenza A/B By PCR (Adult - Flu Only); Future    2. Pharyngitis, unspecified etiology  - POCT Rapid Strep A - Negative    3. shortness of breath  - POCT Influenza A/B - negative  - Influenza A/B By PCR (Adult - Flu Only); Future     Patient screened using COVID-19 guidelines in personal vehicle. Mutually agreed that patient is stable and does not need further evaluation in clinic. Precautions discussed with patient and note provided to patient for self isolation until contacted by [facility] or health care provider for further instruction. Discussed supportive care including increased fluids using electrolyte enriched beverages, OTC tylenol and ibuprofen per manufacturers instructions, cough syrup like Delsym. Patient expressed understanding and agrees to plan.     Please note that this dictation was created using voice recognition software. I have made every reasonable attempt to correct obvious errors, but I expect that there are errors of grammar and possibly content that I did not discover before finalizing the note.

## 2020-03-16 NOTE — TELEPHONE ENCOUNTER
1. Caller Name: Miky Khanna                      Call Back Number: [phone]    [facility] PCP or Specialty Provider: No          2.  Does patient have any active symptoms of respiratory illness (fever OR cough OR shortness of breath)? No.   Has Soar throat overall not feeling well.    Coworker came back positive with COVID19. Boss requesting him to get tested since he had contact with him at work.    3.  Does patient have any comoribidities? None     2015 respiratory virus.    4.  In the last 30 days, has the patient traveled outside of the country OR in a high risk area within the  OR have any known contact with someone who has or is suspected to have COVID-19?  Yes,   [facility] drove     5. POTENTIAL PUI (MODERATE):  Directed to [facility] ([address] [facility]). Will also call [facility]    Triage nurse clear 2 weeks from today.    Note routed to PCP: FYI only.

## 2020-03-17 LAB
C PNEUM DNA SPEC QL NAA+PROBE: NOT DETECTED
FLUAV H1 2009 PAND RNA SPEC QL NAA+PROBE: NOT DETECTED
FLUAV H1 RNA SPEC QL NAA+PROBE: NOT DETECTED
FLUAV H3 RNA SPEC QL NAA+PROBE: NOT DETECTED
FLUAV RNA SPEC QL NAA+PROBE: NOT DETECTED
FLUBV RNA SPEC QL NAA+PROBE: NOT DETECTED
HADV DNA SPEC QL NAA+PROBE: NOT DETECTED
HCOV RNA SPEC QL NAA+PROBE: NOT DETECTED
HMPV RNA SPEC QL NAA+PROBE: NOT DETECTED
HPIV1 RNA SPEC QL NAA+PROBE: NOT DETECTED
HPIV2 RNA SPEC QL NAA+PROBE: NOT DETECTED
HPIV3 RNA SPEC QL NAA+PROBE: NOT DETECTED
HPIV4 RNA SPEC QL NAA+PROBE: NOT DETECTED
M PNEUMO DNA SPEC QL NAA+PROBE: NOT DETECTED
RSV A RNA SPEC QL NAA+PROBE: NOT DETECTED
RSV B RNA SPEC QL NAA+PROBE: NOT DETECTED
RV+EV RNA SPEC QL NAA+PROBE: NOT DETECTED

## 2020-03-20 LAB
SARS-COV-2 RNA SPEC QL NAA+PROBE: NOT DETECTED
SPECIMEN SOURCE: NORMAL

## 2020-03-22 ENCOUNTER — TELEPHONE (OUTPATIENT)
Dept: URGENT CARE | Facility: CLINIC | Age: 30
End: 2020-03-22

## 2020-03-22 NOTE — TELEPHONE ENCOUNTER
LVM regarding negative covid testing. Instructed patient on return to work parameters are in accordance to CDC recommendations. Patient needs to be fever free for 72 hours without use of fever reducing medication or has symptom resolution without OTC medications and 7 days have elapsed since symptom onset       Please note that this dictation was created using voice recognition software. I have made every reasonable attempt to correct obvious errors, but I expect that there are errors of grammar and possibly content that I did not discover before finalizing the note.

## 2020-12-09 ENCOUNTER — HOSPITAL ENCOUNTER (OUTPATIENT)
Facility: MEDICAL CENTER | Age: 30
End: 2020-12-09
Attending: PHYSICIAN ASSISTANT
Payer: COMMERCIAL

## 2020-12-09 ENCOUNTER — OFFICE VISIT (OUTPATIENT)
Dept: URGENT CARE | Facility: PHYSICIAN GROUP | Age: 30
End: 2020-12-09
Payer: COMMERCIAL

## 2020-12-09 VITALS
OXYGEN SATURATION: 98 % | SYSTOLIC BLOOD PRESSURE: 112 MMHG | TEMPERATURE: 97.8 F | HEIGHT: 73 IN | WEIGHT: 250 LBS | DIASTOLIC BLOOD PRESSURE: 82 MMHG | RESPIRATION RATE: 16 BRPM | BODY MASS INDEX: 33.13 KG/M2 | HEART RATE: 82 BPM

## 2020-12-09 DIAGNOSIS — R19.7 DIARRHEA, UNSPECIFIED TYPE: ICD-10-CM

## 2020-12-09 DIAGNOSIS — R05.9 COUGH: ICD-10-CM

## 2020-12-09 DIAGNOSIS — R06.02 SHORTNESS OF BREATH: ICD-10-CM

## 2020-12-09 DIAGNOSIS — G89.29 CHRONIC CHEST PAIN: ICD-10-CM

## 2020-12-09 DIAGNOSIS — J98.8 VIRAL RESPIRATORY ILLNESS: ICD-10-CM

## 2020-12-09 DIAGNOSIS — Z20.822 SUSPECTED COVID-19 VIRUS INFECTION: ICD-10-CM

## 2020-12-09 DIAGNOSIS — R07.9 CHRONIC CHEST PAIN: ICD-10-CM

## 2020-12-09 DIAGNOSIS — B97.89 VIRAL RESPIRATORY ILLNESS: ICD-10-CM

## 2020-12-09 LAB — COVID ORDER STATUS COVID19: NORMAL

## 2020-12-09 PROCEDURE — U0003 INFECTIOUS AGENT DETECTION BY NUCLEIC ACID (DNA OR RNA); SEVERE ACUTE RESPIRATORY SYNDROME CORONAVIRUS 2 (SARS-COV-2) (CORONAVIRUS DISEASE [COVID-19]), AMPLIFIED PROBE TECHNIQUE, MAKING USE OF HIGH THROUGHPUT TECHNOLOGIES AS DESCRIBED BY CMS-2020-01-R: HCPCS

## 2020-12-09 PROCEDURE — 99214 OFFICE O/P EST MOD 30 MIN: CPT | Mod: CS | Performed by: PHYSICIAN ASSISTANT

## 2020-12-09 ASSESSMENT — ENCOUNTER SYMPTOMS
MYALGIAS: 1
ABDOMINAL PAIN: 1
COUGH: 1
FEVER: 1
SORE THROAT: 1
HEADACHES: 1
DIARRHEA: 1
SHORTNESS OF BREATH: 1

## 2020-12-09 NOTE — PROGRESS NOTES
Subjective:   Miky Khanna  is a 29 y.o. male who presents for Body Aches (Stomach pain, diarrhea, back pain, SOB and cough x 5 days)    This is a new problem.  Patient presents to urgent care with 5-day history of generalized body aches, fatigue, back pain, slight shortness of breath, mild cough (nonproductive), generalized abdominal discomfort and one episode of loose stool.  Patient denies any nausea or vomiting.  Denies loss of taste or smell.  Patient's daughter was ill with fever prior to the onset of the patient's symptoms and his girlfriend is now ill with similar illness.  Patient has had no known direct exposure to COVID-19.  Patient's employer requiring testing.    At the end of today's visit patient mentions that he has been experiencing intermittent left chest pain since approximately age 16.  He states that he feels as if his lungs will fill up with fluid and he will have difficulty breathing.  He states that this is affecting his ability to go up and down stairs and perform physical activity.  Patient reports that he did see a specialist and had an echocardiogram and EKG and they did not identify any particular cause.  Patient is concerned that he possibly has done some type of damage to his lungs from years of smoking (he quit 3 years ago).  Patient is not currently experiencing chest pain at this time.      HPI  Review of Systems   Constitutional: Positive for fever and malaise/fatigue.   HENT: Positive for congestion and sore throat.    Respiratory: Positive for cough and shortness of breath.    Cardiovascular: Positive for chest pain.   Gastrointestinal: Positive for abdominal pain and diarrhea.   Musculoskeletal: Positive for myalgias.   Neurological: Positive for headaches.   All other systems reviewed and are negative.    Allergies   Allergen Reactions   • Tramadol Shortness of Breath     Acid refulx     Reviewed past medical, surgical , social and family history.  Reviewed prescription and  "over-the-counter medications with patient and electronic health record today.     Objective:   /82   Pulse 82   Temp 36.6 °C (97.8 °F) (Temporal)   Resp 16   Ht 1.854 m (6' 1\")   Wt 113.4 kg (250 lb)   SpO2 98%   BMI 32.98 kg/m²   Physical Exam  Vitals signs reviewed.   Constitutional:       Appearance: He is well-developed. He is not ill-appearing or toxic-appearing.   HENT:      Head: Normocephalic and atraumatic.      Right Ear: Tympanic membrane, ear canal and external ear normal.      Left Ear: Tympanic membrane, ear canal and external ear normal.      Nose: Nose normal.      Mouth/Throat:      Lips: Pink.      Mouth: Mucous membranes are moist.      Pharynx: Uvula midline. No oropharyngeal exudate.   Eyes:      Conjunctiva/sclera: Conjunctivae normal.      Pupils: Pupils are equal, round, and reactive to light.   Neck:      Musculoskeletal: Normal range of motion and neck supple.   Cardiovascular:      Rate and Rhythm: Normal rate and regular rhythm.      Heart sounds: Normal heart sounds. No murmur. No friction rub.   Pulmonary:      Effort: Pulmonary effort is normal. No respiratory distress.      Breath sounds: Normal breath sounds.   Abdominal:      General: Bowel sounds are normal.      Palpations: Abdomen is soft.      Tenderness: There is no abdominal tenderness.   Musculoskeletal: Normal range of motion.   Lymphadenopathy:      Head:      Right side of head: No submental, submandibular or tonsillar adenopathy.      Left side of head: No submental, submandibular or tonsillar adenopathy.      Cervical: No cervical adenopathy.      Upper Body:      Right upper body: No supraclavicular adenopathy.      Left upper body: No supraclavicular adenopathy.   Skin:     General: Skin is warm and dry.      Findings: No rash.   Neurological:      Mental Status: He is alert and oriented to person, place, and time.      Cranial Nerves: Cranial nerves are intact. No cranial nerve deficit.      Sensory: " Sensation is intact. No sensory deficit.      Motor: Motor function is intact.      Coordination: Coordination is intact. Coordination normal.      Gait: Gait is intact.   Psychiatric:         Attention and Perception: Attention normal.         Mood and Affect: Mood normal.         Speech: Speech normal.         Behavior: Behavior normal.         Thought Content: Thought content normal.         Judgment: Judgment normal.           Assessment/Plan:   1. Suspected COVID-19 virus infection  - COVID/SARS COV-2 PCR; Future    2. Viral respiratory illness  - COVID/SARS COV-2 PCR; Future    3. Cough  - COVID/SARS COV-2 PCR; Future    4. Shortness of breath  - COVID/SARS COV-2 PCR; Future    5. Diarrhea, unspecified type  - COVID/SARS COV-2 PCR; Future    6. Chronic chest pain  - REFERRAL TO FOLLOW-UP WITH PRIMARY CARE    Testing performed for COVID-19.  Patient/guardian is given printed /MyChart instructions regarding self-isolation.  Work/school note is provided with specific return to work/school protocols.  Reviewed with patient/guardian that if they do test positive they will be contacted by their local health department regarding return to work/school protocols.  Results will also be released to patient/guardian in MyChart or called to the patient/guardian directly.  Encouraged mask use, frequent handwashing, wiping down hard surfaces, etc.    Pedialyte, BRAT diet with advance as tolerated.  Symptomatic and supportive care.  Increase fluids, rest.  Nasal saline rinse  Over-the-counter Flonase nasal spray per 's instructions  Mucinex as needed      May use Tylenol or ibuprofen over-the-counter as needed for pain or fever not to exceed recommended daily dose.    Regarding patient's concern for chronic left chest pain and difficulty breathing referral placed to follow-up and establish care with primary care.    Upon entering exam room I ensured patient was wearing a mask.  This provider wore appropriate PPE  throughout entire visit.  Patient wore mask entire visit except for a brief period while examining oropharynx.    Differential diagnosis, natural history, supportive care, and indications for immediate follow-up discussed.     Red flag warning symptoms and strict ER/follow-up precautions given.  The patient demonstrated a good understanding and agreed with the treatment plan.  Please note that this note was created using voice recognition speech to text software. Every effort has been made to correct obvious errors.  However, I expect there are errors of grammar and possibly context that were not discovered prior to finalizing the note  TISHA Tyson PA-C

## 2020-12-09 NOTE — LETTER
December 9, 2020         Patient: Miky Khanna   YOB: 1990   Date of Visit: 12/9/2020      Your employee was seen in our clinic today.  A concern for COVID-19 has been identified and testing is in progress. We are asking you to excuse absences while following self-isolation protocol per Center for Disease Control (CDC) guidelines.  Your employee will be able to access test results through our electronic delivery system called [employer].     If the results of testing are positive, your employee should follow the CDC guidelines.  These are isolation for a minimum of 10 days and at least 24 hours have passed since your last fever without the use of fever-reducing medications and all other symptoms have improved.  The health department may contact you and provide further directions regarding self-isolation and return to work.     Negative result without close contact*:  If your employee was tested due to their symptoms without close contact to someone with COVID-19 and their test is negative: your employee should not return to work or regular activities until 24 hours after symptoms fully improve. (For example, if patient feels back to normal on Tuesday, should remain isolated through Wednesday).      Negative with close contact*:  If your employee was tested due to close contact to someone, they should self isolate for 14 days after their exposure.    Negative with positive household member  If your employee was due to a positive household member they should still quarantine for a period of 14 days.  The 14 day period begins once the household member is isolated. If unable to quarantine (for example mom from infant), the CDC advises an additional 14-day quarantine period from the COVID-19 household member.   In general, repeat testing is not necessary and not offered through our [facility].     This is the only note that will be provided from [facility] for this visit.  Your employee will  require an appointment with a primary care provider if FMLA or disability forms are required.  If you have any questions please do not hesitate to call me at the phone number listed below.    Sincerely,    RJ Calvillo.-C.  [phone]

## 2020-12-10 LAB
SARS-COV-2 RNA RESP QL NAA+PROBE: NOTDETECTED
SPECIMEN SOURCE: NORMAL

## 2020-12-11 ENCOUNTER — TELEPHONE (OUTPATIENT)
Dept: SCHEDULING | Facility: IMAGING CENTER | Age: 30
End: 2020-12-11

## 2021-01-05 ENCOUNTER — APPOINTMENT (OUTPATIENT)
Dept: BEHAVIORAL HEALTH | Facility: CLINIC | Age: 31
End: 2021-01-05
Payer: COMMERCIAL

## 2021-03-30 ENCOUNTER — TELEPHONE (OUTPATIENT)
Dept: SCHEDULING | Facility: IMAGING CENTER | Age: 31
End: 2021-03-30

## 2021-04-07 ENCOUNTER — OFFICE VISIT (OUTPATIENT)
Dept: MEDICAL GROUP | Facility: PHYSICIAN GROUP | Age: 31
End: 2021-04-07
Payer: COMMERCIAL

## 2021-04-07 ENCOUNTER — HOSPITAL ENCOUNTER (OUTPATIENT)
Dept: LAB | Facility: MEDICAL CENTER | Age: 31
End: 2021-04-07
Attending: FAMILY MEDICINE
Payer: COMMERCIAL

## 2021-04-07 VITALS
DIASTOLIC BLOOD PRESSURE: 76 MMHG | BODY MASS INDEX: 33.32 KG/M2 | HEART RATE: 72 BPM | TEMPERATURE: 97.7 F | OXYGEN SATURATION: 95 % | SYSTOLIC BLOOD PRESSURE: 100 MMHG | WEIGHT: 238 LBS | HEIGHT: 71 IN | RESPIRATION RATE: 14 BRPM

## 2021-04-07 DIAGNOSIS — Z00.00 WELLNESS EXAMINATION: ICD-10-CM

## 2021-04-07 DIAGNOSIS — G89.29 ELBOW PAIN, CHRONIC, UNSPECIFIED LATERALITY: ICD-10-CM

## 2021-04-07 DIAGNOSIS — H53.9 VISUAL DISORDER: ICD-10-CM

## 2021-04-07 DIAGNOSIS — M25.529 ELBOW PAIN, CHRONIC, UNSPECIFIED LATERALITY: ICD-10-CM

## 2021-04-07 DIAGNOSIS — E87.1 HYPONATREMIA: ICD-10-CM

## 2021-04-07 DIAGNOSIS — F45.8 BRUXISM: ICD-10-CM

## 2021-04-07 DIAGNOSIS — A63.0 GENITAL WARTS: ICD-10-CM

## 2021-04-07 DIAGNOSIS — R51.9 NONINTRACTABLE EPISODIC HEADACHE, UNSPECIFIED HEADACHE TYPE: ICD-10-CM

## 2021-04-07 DIAGNOSIS — Z23 NEED FOR VACCINATION: Primary | ICD-10-CM

## 2021-04-07 LAB
ALBUMIN SERPL BCP-MCNC: 4.8 G/DL (ref 3.2–4.9)
ALBUMIN/GLOB SERPL: 1.8 G/DL
ALP SERPL-CCNC: 76 U/L (ref 30–99)
ALT SERPL-CCNC: 44 U/L (ref 2–50)
ANION GAP SERPL CALC-SCNC: 8 MMOL/L (ref 7–16)
AST SERPL-CCNC: 27 U/L (ref 12–45)
BASOPHILS # BLD AUTO: 0.6 % (ref 0–1.8)
BASOPHILS # BLD: 0.05 K/UL (ref 0–0.12)
BILIRUB SERPL-MCNC: 0.4 MG/DL (ref 0.1–1.5)
BUN SERPL-MCNC: 25 MG/DL (ref 8–22)
CALCIUM SERPL-MCNC: 9.5 MG/DL (ref 8.5–10.5)
CHLORIDE SERPL-SCNC: 100 MMOL/L (ref 96–112)
CHOLEST SERPL-MCNC: 158 MG/DL (ref 100–199)
CO2 SERPL-SCNC: 25 MMOL/L (ref 20–33)
CREAT SERPL-MCNC: 0.98 MG/DL (ref 0.5–1.4)
EOSINOPHIL # BLD AUTO: 0.24 K/UL (ref 0–0.51)
EOSINOPHIL NFR BLD: 2.7 % (ref 0–6.9)
ERYTHROCYTE [DISTWIDTH] IN BLOOD BY AUTOMATED COUNT: 40.8 FL (ref 35.9–50)
FASTING STATUS PATIENT QL REPORTED: NORMAL
GLOBULIN SER CALC-MCNC: 2.6 G/DL (ref 1.9–3.5)
GLUCOSE SERPL-MCNC: 85 MG/DL (ref 65–99)
HCT VFR BLD AUTO: 47.6 % (ref 42–52)
HDLC SERPL-MCNC: 42 MG/DL
HGB BLD-MCNC: 16 G/DL (ref 14–18)
IMM GRANULOCYTES # BLD AUTO: 0.03 K/UL (ref 0–0.11)
IMM GRANULOCYTES NFR BLD AUTO: 0.3 % (ref 0–0.9)
LDLC SERPL CALC-MCNC: 86 MG/DL
LYMPHOCYTES # BLD AUTO: 2.3 K/UL (ref 1–4.8)
LYMPHOCYTES NFR BLD: 25.8 % (ref 22–41)
MCH RBC QN AUTO: 29.7 PG (ref 27–33)
MCHC RBC AUTO-ENTMCNC: 33.6 G/DL (ref 33.7–35.3)
MCV RBC AUTO: 88.5 FL (ref 81.4–97.8)
MONOCYTES # BLD AUTO: 0.78 K/UL (ref 0–0.85)
MONOCYTES NFR BLD AUTO: 8.8 % (ref 0–13.4)
NEUTROPHILS # BLD AUTO: 5.5 K/UL (ref 1.82–7.42)
NEUTROPHILS NFR BLD: 61.8 % (ref 44–72)
NRBC # BLD AUTO: 0 K/UL
NRBC BLD-RTO: 0 /100 WBC
PLATELET # BLD AUTO: 318 K/UL (ref 164–446)
PMV BLD AUTO: 11 FL (ref 9–12.9)
POTASSIUM SERPL-SCNC: 3.6 MMOL/L (ref 3.6–5.5)
PROT SERPL-MCNC: 7.4 G/DL (ref 6–8.2)
RBC # BLD AUTO: 5.38 M/UL (ref 4.7–6.1)
SODIUM SERPL-SCNC: 133 MMOL/L (ref 135–145)
TRIGL SERPL-MCNC: 152 MG/DL (ref 0–149)
WBC # BLD AUTO: 8.9 K/UL (ref 4.8–10.8)

## 2021-04-07 PROCEDURE — 85025 COMPLETE CBC W/AUTO DIFF WBC: CPT

## 2021-04-07 PROCEDURE — 90715 TDAP VACCINE 7 YRS/> IM: CPT | Performed by: FAMILY MEDICINE

## 2021-04-07 PROCEDURE — 36415 COLL VENOUS BLD VENIPUNCTURE: CPT

## 2021-04-07 PROCEDURE — 80053 COMPREHEN METABOLIC PANEL: CPT

## 2021-04-07 PROCEDURE — 90471 IMMUNIZATION ADMIN: CPT | Performed by: FAMILY MEDICINE

## 2021-04-07 PROCEDURE — 99385 PREV VISIT NEW AGE 18-39: CPT | Mod: 25 | Performed by: FAMILY MEDICINE

## 2021-04-07 PROCEDURE — 80061 LIPID PANEL: CPT

## 2021-04-07 RX ORDER — DICLOFENAC SODIUM 75 MG/1
75 TABLET, DELAYED RELEASE ORAL 2 TIMES DAILY
COMMUNITY
End: 2021-12-30

## 2021-04-07 ASSESSMENT — PATIENT HEALTH QUESTIONNAIRE - PHQ9: CLINICAL INTERPRETATION OF PHQ2 SCORE: 0

## 2021-04-07 NOTE — ASSESSMENT & PLAN NOTE
This is a chronic problem.  Patient states he has some recurrent genital warts and is asking if he can use over-the-counter cryotherapy treatment on them.  We discussed and I told him that was fine to try it if is not beneficial call in for a Condylox prescription or we can refer him for further treatment.

## 2021-04-07 NOTE — ASSESSMENT & PLAN NOTE
This is a chronic problem.  Patient has a history of grinding of his teeth at night.  He states he is use some muscle relaxants in the past and is asking for refill.  I told him this is not indicated for this.  He should be using a mouthguard which his dog apparently has chewed up.  He is seeing his dentist next week and will discuss the issue with him.

## 2021-04-07 NOTE — ASSESSMENT & PLAN NOTE
This is a chronic problem as described above under the headache symptom.  Denies any loss of vision or double vision.

## 2021-04-07 NOTE — ASSESSMENT & PLAN NOTE
This is a chronic screening issue.  Patient is here to establish care and wants to get baseline labs.

## 2021-04-07 NOTE — PROGRESS NOTES
Subjective:     CC:   Chief Complaint   Patient presents with   • Establish Care     discuss maybe colitis scope vision       HPI:   Miky Khanna is a 30 y.o. male who presents for annual exam    Last Tdap: Given today.  Received HPV series: Aged out  Hx STDs: Yes, Genital warts    Exercise: no regular exercise, sedentary  Diet: Unhealthy by history    He  has a past medical history of ADHD, Anxiety, and Substance abuse (HCC).  He  has no past surgical history on file.    Family History   Problem Relation Age of Onset   • Psychiatric Illness Father         suicide   • Cancer Brother         lung cancer, 43   • Alcohol/Drug Brother         meth, heroine   • Cancer Maternal Grandfather         lung cancer   • Hypertension Mother    • Diabetes Neg Hx    • Heart Disease Neg Hx      Social History     Tobacco Use   • Smoking status: Former Smoker     Packs/day: 0.25     Quit date: 2017     Years since quittin.1   • Smokeless tobacco: Never Used   Substance Use Topics   • Alcohol use: Yes     Comment: rare   • Drug use: Yes     Types: Marijuana     Comment: marijuana-     He  reports being sexually active and has had partner(s) who are Female.    Patient Active Problem List    Diagnosis Date Noted   • Headache 2021   • Visual disorder 2021   • Wellness examination 2021   • Elbow pain, chronic, unspecified laterality 2017   • Nasal discharge 2017   • Grade I hemorrhoids 2017   • Eczema 2017   • Obesity (BMI 30-39.9) 2017   • Bruxism 2017   • Sinus disease 2017   • Genital warts 2015   • ADD (attention deficit disorder) 2015     Current Outpatient Medications   Medication Sig Dispense Refill   • diclofenac DR (VOLTAREN) 75 MG Tablet Delayed Response Take 75 mg by mouth 2 times a day. As needed       No current facility-administered medications for this visit.     Allergies   Allergen Reactions   • Tramadol Shortness of Breath     Acid refulx  "      Review of Systems   Constitutional: Negative for fever, chills and malaise/fatigue.   HENT: Negative for congestion.    Eyes: Negative for pain.   Respiratory: Negative for cough and shortness of breath.    Cardiovascular: Negative for chest pain and leg swelling.   Gastrointestinal: Negative for nausea, vomiting, abdominal pain and diarrhea.   Genitourinary: Negative for dysuria and hematuria.   Skin: Negative for rash.   Neurological: Negative for dizziness, focal weakness and headaches.   Endo/Heme/Allergies: Does not bruise/bleed easily.   Psychiatric/Behavioral: Negative for depression.  The patient is not nervous/anxious.      Objective:   /76 (BP Location: Left arm, Patient Position: Sitting, BP Cuff Size: Large adult)   Pulse 72   Temp 36.5 °C (97.7 °F) (Temporal)   Resp 14   Ht 1.81 m (5' 11.25\")   Wt 108 kg (238 lb)   SpO2 95%   BMI 32.96 kg/m²      Wt Readings from Last 4 Encounters:   04/07/21 108 kg (238 lb)   12/09/20 113 kg (250 lb)   12/11/18 109 kg (240 lb)   10/07/18 97.5 kg (215 lb)            Physical Exam:  Constitutional: Well-developed and well-nourished. Not diaphoretic. No distress.   Skin: Skin is warm and dry. No rash noted.  Head: Atraumatic without lesions.  Eyes: Conjunctivae and extraocular motions are normal. Pupils are equal, round, and reactive to light. No scleral icterus.   Ears:  External ears unremarkable. Tympanic membranes clear and intact.  Neck: Supple, trachea midline. Normal range of motion. No thyromegaly present. No lymphadenopathy--cervical or supraclavicular.  Cardiovascular: Regular rate and rhythm, S1 and S2 without murmur, rubs, or gallops.    Abdomen: Soft, non tender, and without distention. Active bowel sounds in all four quadrants. No rebound, guarding, masses or HSM.  Extremities: No cyanosis, clubbing, erythema, nor edema. Distal pulses intact and symmetric.   Musculoskeletal: Patient describes pain in his medial and lateral epicondyle elbow " area bilaterally with range of motion and palpation.  No swelling is noted.  No neuropathy noted.  Neurological: Alert and oriented x 3. Grossly non-focal. Strength and sensation grossly intact. DTRs 2+/3 and symmetric.   Psychiatric:  Behavior, mood, and affect are appropriate.      Assessment and Plan:     1. Need for vaccination  - Tdap Vaccine =>6YO IM    2. Elbow pain, chronic, unspecified laterality  - REFERRAL TO PHYSICAL THERAPY  This is a chronic problem.  I discussed with patient the use of epicondylar straps.  Also proper positioning while he is playing games or working.  He is to use Voltaren on a more constant basis and see if that is more beneficial.  We will also refer to physical therapy to see if that is helpful especially to teach him stretching and strengthening exercises.  If he has continued troubles we can refer him to orthopedics next.  3. Nonintractable episodic headache, unspecified headache type  This is a chronic problem.  He was told to wear his sunglasses when going from dimly lit rooms into bright rooms.  We will see if his symptoms improve or not.  He is also to hydrate better and try to avoid aggravating situations such as red bull and marijuana use.  If he has continued troubles or worsening we will do further work-up.  4. Visual disorder  - REFERRAL TO OPTOMETRY  This is a chronic problem.  Referral to optometry made for baseline exam.  5. Wellness examination  - CBC WITH DIFFERENTIAL; Future  - Comp Metabolic Panel; Future  - Lipid Profile; Future  This is a chronic screening issue.  Patient was talked to about healthy lifestyle.  I encouraged him to improve his diet and avoid marijuana use.  Also should exercise on a more regular basis.  6. Genital warts  This is a chronic problem.  Patient told if over-the-counter treatments are not beneficial he can call in for Condylox prescription.  7. Bruxism  This is a chronic problem.  Follow-up with his dentist.  =      Health maintenance:  Done  Labs per orders  Immunizations per orders  Patient counseled about skin care, diet, supplements, and exercise.      Follow-up: Return if symptoms worsen or fail to improve.

## 2021-04-07 NOTE — ASSESSMENT & PLAN NOTE
This is a chronic problem over the last few years.  Patient states that he has been having problems with headache that can come on suddenly behind the right orbit.  About 2 years ago he started getting a squiggly line in his vision that he describes as a kaleidoscope vision.  It only happens about 3 times since then.  It is not always associated with a headache however.  He also last year had some what look like stars going across his vision that lasted about 10 minutes.  Of all these episodes seem to occur when he goes from a dimly  lit room out into a very bright madai area.

## 2021-04-07 NOTE — ASSESSMENT & PLAN NOTE
This is a chronic problem.  Patient's been having trouble with pain in both forearms and elbow areas.  He states it bothered him a few years ago when he is doing repetitive work at [employer] but he has now changed employment.  He does do a lot of guicho for payment and states it bothers him during that time as well.  He described pain on the inside and outside of both forearms.  He also can get some numbness down the medial aspect of both forearms to his fifth fourth and fifth digits.  He has not tried any braces for this.  Has not tried any over-the-counter anti-inflammatory agents but he has used some of the Voltaren from her friend and that seems to have helped a little.

## 2021-05-24 ENCOUNTER — OFFICE VISIT (OUTPATIENT)
Dept: URGENT CARE | Facility: PHYSICIAN GROUP | Age: 31
End: 2021-05-24
Payer: COMMERCIAL

## 2021-05-24 VITALS
OXYGEN SATURATION: 94 % | TEMPERATURE: 97.8 F | SYSTOLIC BLOOD PRESSURE: 118 MMHG | HEIGHT: 71 IN | BODY MASS INDEX: 32.9 KG/M2 | HEART RATE: 57 BPM | RESPIRATION RATE: 16 BRPM | WEIGHT: 235 LBS | DIASTOLIC BLOOD PRESSURE: 70 MMHG

## 2021-05-24 DIAGNOSIS — M62.838 MUSCLE SPASMS OF NECK: ICD-10-CM

## 2021-05-24 DIAGNOSIS — S16.1XXA STRAIN OF NECK MUSCLE, INITIAL ENCOUNTER: ICD-10-CM

## 2021-05-24 PROCEDURE — 99213 OFFICE O/P EST LOW 20 MIN: CPT | Performed by: STUDENT IN AN ORGANIZED HEALTH CARE EDUCATION/TRAINING PROGRAM

## 2021-05-24 RX ORDER — METHOCARBAMOL 500 MG/1
TABLET, FILM COATED ORAL
Qty: 40 TABLET | Refills: 0 | Status: SHIPPED | OUTPATIENT
Start: 2021-05-24 | End: 2022-08-18

## 2021-05-24 RX ORDER — KETOROLAC TROMETHAMINE 30 MG/ML
30 INJECTION, SOLUTION INTRAMUSCULAR; INTRAVENOUS ONCE
Status: COMPLETED | OUTPATIENT
Start: 2021-05-24 | End: 2021-05-24

## 2021-05-24 RX ORDER — LIDOCAINE 4 G/G
PATCH TOPICAL
Qty: 15 PATCH | Refills: 0 | Status: SHIPPED | OUTPATIENT
Start: 2021-05-24 | End: 2021-06-04

## 2021-05-24 RX ADMIN — KETOROLAC TROMETHAMINE 30 MG: 30 INJECTION, SOLUTION INTRAMUSCULAR; INTRAVENOUS at 16:48

## 2021-05-24 ASSESSMENT — FIBROSIS 4 INDEX: FIB4 SCORE: 0.38

## 2021-05-24 NOTE — PROGRESS NOTES
Subjective:   CHIEF COMPLAINT  Chief Complaint   Patient presents with   • Back Pain     7x days ago, wants an x-ray        HPI  Miky Khanna is a 30 y.o. male who presents with a chief complaint of left-sided neck pain for approximately 1 week.  Patient reports initial onset he was wrestling with a friend and was tackled straining his neck  Said the symptoms mostly resolved however yesterday he was weightlifting for the first time in quite a while, targeting his biceps and shoulders, and following the workout he developed gradual onset of sharp pain along the left upper trapezius muscle. Says today the symptoms are much improved.  He has not tried taking any medications.  Symptoms are worse with flexion of the left rotation of the neck.  Reports he has chronic paresthesias of his bilateral upper extremities but no new changes.    REVIEW OF SYSTEMS  General: no fever or chills  GI: no nausea or vomiting  See HPI for further details.    PAST MEDICAL HISTORY   has a past medical history of ADHD, Anxiety, and Substance abuse (HCC).    SURGICAL HISTORY  patient denies any surgical history    ALLERGIES  Allergies   Allergen Reactions   • Tramadol Shortness of Breath     Acid refulx       CURRENT MEDICATIONS  Home Medications     Reviewed by Stevan Brito D.O. (Physician) on 21 at 1442  Med List Status: <None>   Medication Last Dose Status   diclofenac DR (VOLTAREN) 75 MG Tablet Delayed Response Taking Active                SOCIAL HISTORY  Social History     Tobacco Use   • Smoking status: Former Smoker     Packs/day: 0.25     Quit date: 2017     Years since quittin.3   • Smokeless tobacco: Never Used   Vaping Use   • Vaping Use: Never used   Substance and Sexual Activity   • Alcohol use: Yes     Comment: rare   • Drug use: Yes     Types: Marijuana     Comment: marijuana-   • Sexual activity: Yes     Partners: Female     Comment: no STD, two children       FAMILY HISTORY  Family History   Problem Relation  "Age of Onset   • Psychiatric Illness Father         suicide   • Cancer Brother         lung cancer, 43   • Alcohol/Drug Brother         meth, heroine   • Cancer Maternal Grandfather         lung cancer   • Hypertension Mother    • Diabetes Neg Hx    • Heart Disease Neg Hx           Objective:   PHYSICAL EXAM  VITAL SIGNS: /70 (BP Location: Right arm, Patient Position: Sitting, BP Cuff Size: Large adult)   Pulse (!) 57   Temp 36.6 °C (97.8 °F) (Temporal)   Resp 16   Ht 1.803 m (5' 11\")   Wt 107 kg (235 lb)   SpO2 94%   BMI 32.78 kg/m²     Gen: no acute distress, normal voice  Skin: dry, intact, moist mucosal membranes  Lungs: CTAB w/ symmetric expansion  CV: RRR w/o murmurs or clicks  MSK: Cervical and thoracic spine: No erythema, ecchymosis or edema.  Full range of motion with mild discernible discomfort with flexion and left rotation of the neck; able to actively rotate 45 degrees bilaterally.  Very mild TTP along C7 without any crepitus or step-off.  Moderate TTP along the body of the left trapezius muscle.  Distal sensation and motor fully intact.  Psych: normal affect, normal judgement, alert, awake    Assessment/Plan:     1. Strain of neck muscle, initial encounter     2. Muscle spasms of neck     Symptoms started with gradual onset after working out yesterday for the first time in quite a while.  Symptoms have improved today. Discussed symptomatic treatment and continued monitoring.  -Ordered Toradol 30 mg IM x1  -Ordered Robaxin  -Ordered topical lidocaine  -Continue taking p.o. diclofenac as needed  -Discussed red flags and ED precautions.  Patient verbalized understanding.  All questions answered.    Differential diagnosis, natural history, supportive care, and indications for immediate follow-up discussed. All questions answered. Patient agrees with the plan of care.    Follow-up as needed if symptoms worsen or fail to improve to PCP, Urgent care or Emergency Room.    Please note that this " dictation was created using voice recognition software. I have made a reasonable attempt to correct obvious errors, but I expect that there are errors of grammar and possibly content that I did not discover before finalizing the note.

## 2021-05-24 NOTE — LETTER
May 24, 2021       Patient: Miky Khanna   YOB: 1990   Date of Visit: 5/24/2021         To Whom It May Concern:    Miky Khanna was seen in the clinic on 5/24/2021 for medical reasons and is excused from work today.    If you have any questions or concerns, please don't hesitate to call [phone]    Sincerely,    Stevan Brito D.O.  Electronically Signed

## 2021-05-28 ENCOUNTER — TELEMEDICINE (OUTPATIENT)
Dept: BEHAVIORAL HEALTH | Facility: CLINIC | Age: 31
End: 2021-05-28
Payer: COMMERCIAL

## 2021-05-28 DIAGNOSIS — F90.2 ATTENTION DEFICIT HYPERACTIVITY DISORDER (ADHD), COMBINED TYPE: ICD-10-CM

## 2021-05-28 PROCEDURE — 99204 OFFICE O/P NEW MOD 45 MIN: CPT | Performed by: PSYCHIATRY & NEUROLOGY

## 2021-05-28 PROCEDURE — 90833 PSYTX W PT W E/M 30 MIN: CPT | Performed by: PSYCHIATRY & NEUROLOGY

## 2021-05-28 RX ORDER — LISDEXAMFETAMINE DIMESYLATE CAPSULES 20 MG/1
20 CAPSULE ORAL
Qty: 30 CAPSULE | Refills: 0 | Status: SHIPPED | OUTPATIENT
Start: 2021-05-28 | End: 2021-06-27
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
## 2021-06-04 ENCOUNTER — OFFICE VISIT (OUTPATIENT)
Dept: MEDICAL GROUP | Facility: PHYSICIAN GROUP | Age: 31
End: 2021-06-04
Payer: COMMERCIAL

## 2021-06-04 VITALS
DIASTOLIC BLOOD PRESSURE: 80 MMHG | WEIGHT: 230.8 LBS | TEMPERATURE: 97.8 F | SYSTOLIC BLOOD PRESSURE: 124 MMHG | OXYGEN SATURATION: 97 % | RESPIRATION RATE: 16 BRPM | HEART RATE: 60 BPM | HEIGHT: 71 IN | BODY MASS INDEX: 32.31 KG/M2

## 2021-06-04 DIAGNOSIS — M54.2 CERVICALGIA: ICD-10-CM

## 2021-06-04 DIAGNOSIS — M79.10 MYALGIA: ICD-10-CM

## 2021-06-04 DIAGNOSIS — G47.9 SLEEP DISTURBANCE: ICD-10-CM

## 2021-06-04 PROBLEM — J34.89 NASAL DISCHARGE: Status: RESOLVED | Noted: 2017-11-07 | Resolved: 2021-06-04

## 2021-06-04 PROBLEM — Z00.00 WELLNESS EXAMINATION: Status: RESOLVED | Noted: 2021-04-07 | Resolved: 2021-06-04

## 2021-06-04 PROBLEM — J34.9 SINUS DISEASE: Status: RESOLVED | Noted: 2017-11-07 | Resolved: 2021-06-04

## 2021-06-04 PROCEDURE — 99214 OFFICE O/P EST MOD 30 MIN: CPT | Performed by: FAMILY MEDICINE

## 2021-06-04 ASSESSMENT — FIBROSIS 4 INDEX: FIB4 SCORE: 0.38

## 2021-06-04 NOTE — ASSESSMENT & PLAN NOTE
This is a chronic progressive problem.  Patient states over the last 5 years or so he has had recurrent episodes of myalgias.  He is concerned since his sister has a history of lupus and he wants to be tested for that as well.    Currently he is having upper back and shoulder pains with feeling of weakness in his upper arms.
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
## 2021-06-04 NOTE — ASSESSMENT & PLAN NOTE
This is a chronic problem.  Patient reports that his wife is said that he stops breathing during the night.  He is also having periods during the day where he falls asleep it while at work.  And he feels very fatigued.
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
## 2021-06-17 ENCOUNTER — APPOINTMENT (OUTPATIENT)
Dept: BEHAVIORAL HEALTH | Facility: CLINIC | Age: 31
End: 2021-06-17
Payer: COMMERCIAL

## 2021-06-28 ENCOUNTER — TELEMEDICINE (OUTPATIENT)
Dept: BEHAVIORAL HEALTH | Facility: CLINIC | Age: 31
End: 2021-06-28
Payer: COMMERCIAL

## 2021-06-28 DIAGNOSIS — F90.2 ATTENTION DEFICIT HYPERACTIVITY DISORDER (ADHD), COMBINED TYPE: ICD-10-CM

## 2021-06-28 DIAGNOSIS — G47.9 SLEEP DISTURBANCE: ICD-10-CM

## 2021-06-28 PROCEDURE — 99213 OFFICE O/P EST LOW 20 MIN: CPT | Performed by: PSYCHIATRY & NEUROLOGY

## 2021-06-28 RX ORDER — LISDEXAMFETAMINE DIMESYLATE 20 MG/1
20 CAPSULE ORAL EVERY MORNING
Qty: 30 CAPSULE | Refills: 0 | Status: SHIPPED | OUTPATIENT
Start: 2021-07-29 | End: 2021-08-28

## 2021-06-28 RX ORDER — LISDEXAMFETAMINE DIMESYLATE 20 MG/1
20 CAPSULE ORAL EVERY MORNING
Qty: 30 CAPSULE | Refills: 0 | Status: SHIPPED | OUTPATIENT
Start: 2021-06-28 | End: 2021-07-28

## 2021-06-28 RX ORDER — LISDEXAMFETAMINE DIMESYLATE 20 MG/1
20 CAPSULE ORAL EVERY MORNING
Qty: 30 CAPSULE | Refills: 0 | Status: SHIPPED | OUTPATIENT
Start: 2021-08-29 | End: 2021-09-28
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
## 2021-10-24 ENCOUNTER — HOSPITAL ENCOUNTER (EMERGENCY)
Facility: MEDICAL CENTER | Age: 31
End: 2021-10-24
Attending: EMERGENCY MEDICINE | Admitting: EMERGENCY MEDICINE
Payer: COMMERCIAL

## 2021-10-24 ENCOUNTER — APPOINTMENT (OUTPATIENT)
Dept: RADIOLOGY | Facility: MEDICAL CENTER | Age: 31
End: 2021-10-24
Attending: EMERGENCY MEDICINE
Payer: COMMERCIAL

## 2021-10-24 VITALS
HEIGHT: 72 IN | WEIGHT: 240 LBS | HEART RATE: 79 BPM | TEMPERATURE: 98.2 F | SYSTOLIC BLOOD PRESSURE: 128 MMHG | OXYGEN SATURATION: 93 % | DIASTOLIC BLOOD PRESSURE: 80 MMHG | BODY MASS INDEX: 32.51 KG/M2 | RESPIRATION RATE: 16 BRPM

## 2021-10-24 DIAGNOSIS — Z20.822 SUSPECTED COVID-19 VIRUS INFECTION: ICD-10-CM

## 2021-10-24 DIAGNOSIS — J06.9 UPPER RESPIRATORY TRACT INFECTION, UNSPECIFIED TYPE: ICD-10-CM

## 2021-10-24 PROCEDURE — 71045 X-RAY EXAM CHEST 1 VIEW: CPT

## 2021-10-24 PROCEDURE — 99283 EMERGENCY DEPT VISIT LOW MDM: CPT

## 2021-10-24 PROCEDURE — C9803 HOPD COVID-19 SPEC COLLECT: HCPCS

## 2021-10-24 PROCEDURE — 0240U HCHG SARS-COV-2 COVID-19 NFCT DS RESP RNA 3 TRGT MIC: CPT

## 2021-10-24 PROCEDURE — C9803 HOPD COVID-19 SPEC COLLECT: HCPCS | Performed by: EMERGENCY MEDICINE

## 2021-10-24 ASSESSMENT — FIBROSIS 4 INDEX: FIB4 SCORE: 0.38

## 2021-10-25 LAB
FLUAV RNA SPEC QL NAA+PROBE: NEGATIVE
FLUBV RNA SPEC QL NAA+PROBE: NEGATIVE
SARS-COV-2 RNA RESP QL NAA+PROBE: NOTDETECTED
SPECIMEN SOURCE: NORMAL

## 2021-10-25 NOTE — ED NOTES
Pt discharged home. Pt in possession of belongings. Pt provided discharge education and information pertaining to medications and follow up appointments. Pt received copy of discharge instructions and verbalized understanding. /80   Pulse 79   Temp 36.8 °C (98.2 °F) (Temporal)   Resp 16   Ht 1.829 m (6')   Wt 109 kg (240 lb)   SpO2 93%   BMI 32.55 kg/m²

## 2021-10-25 NOTE — ED TRIAGE NOTES
Chief Complaint   Patient presents with   • Flu Like Symptoms     for 4 days. SOB, cough, chills, body aches, diarrhea     Pt presents for above. Reports 4 days of SOB, productive cough, chills and possible fever, body aches and diarrhea. Pt is not vaccinated against covid.

## 2021-10-25 NOTE — DISCHARGE INSTRUCTIONS
You should quarantine at home and wear a mask around others until you see that you have a negative Covid test result. Your test result will be available within 24 hours on the [facility] website and you should check that tomorrow. If the Covid test is positive you will have to quarantine at home for 14 days and then follow-up with your doctor before returning to regular activities. Return here if you feel you are developing new or worsening symptoms. You should discontinue your use of marijuana products. Use Tylenol and ibuprofen if needed for discomfort

## 2021-10-25 NOTE — ED PROVIDER NOTES
ED Provider Note    CHIEF COMPLAINT  Chief Complaint   Patient presents with   • Flu Like Symptoms     for 4 days. SOB, cough, chills, body aches, diarrhea       HPI  Miky Dav Khanna is a 30 y.o. male who presents to the emergency department complaining of 4 days of cough productive of a whitish phlegm he thinks he may have had a fever yesterday but did not check his temperature.  The patient says that for many years she has had a pain in the left anterior chest this primarily happens when he vapes marijuana.  He has not had a Covid vaccine.    REVIEW OF SYSTEMS no hemoptysis no vomiting he is able to tolerate oral intake.  All other systems negative    PAST MEDICAL HISTORY  Past Medical History:   Diagnosis Date   • ADHD    • Anxiety    • Substance abuse (HCC)        FAMILY HISTORY  Family History   Problem Relation Age of Onset   • Psychiatric Illness Father         suicide   • Cancer Brother         lung cancer, 43   • Alcohol/Drug Brother         meth, heroine   • Cancer Maternal Grandfather         lung cancer   • Hypertension Mother    • Diabetes Neg Hx    • Heart Disease Neg Hx        SOCIAL HISTORY  Social History     Socioeconomic History   • Marital status: Single     Spouse name: Not on file   • Number of children: Not on file   • Years of education: Not on file   • Highest education level: Not on file   Occupational History   • Not on file   Tobacco Use   • Smoking status: Former Smoker     Packs/day: 0.25     Quit date: 2017     Years since quittin.7   • Smokeless tobacco: Never Used   Vaping Use   • Vaping Use: Some days   • Substances: THC, slight   Substance and Sexual Activity   • Alcohol use: Yes     Comment: rare   • Drug use: Yes     Types: Marijuana, Inhaled     Comment: marijuana-   • Sexual activity: Yes     Partners: Female     Comment: no STD, two children   Other Topics Concern   • Not on file   Social History Narrative   • Not on file     Social Determinants of Health      Financial Resource Strain:    • Difficulty of Paying Living Expenses:    Food Insecurity:    • Worried About Running Out of Food in the Last Year:    • Ran Out of Food in the Last Year:    Transportation Needs:    • Lack of Transportation (Medical):    • Lack of Transportation (Non-Medical):    Physical Activity:    • Days of Exercise per Week:    • Minutes of Exercise per Session:    Stress:    • Feeling of Stress :    Social Connections:    • Frequency of Communication with Friends and Family:    • Frequency of Social Gatherings with Friends and Family:    • Attends Muslim Services:    • Active Member of Clubs or Organizations:    • Attends Club or Organization Meetings:    • Marital Status:    Intimate Partner Violence:    • Fear of Current or Ex-Partner:    • Emotionally Abused:    • Physically Abused:    • Sexually Abused:        SURGICAL HISTORY  History reviewed. No pertinent surgical history.    CURRENT MEDICATIONS  Home Medications     Reviewed by Stephie Dominguez R.N. (Registered Nurse) on 10/24/21 at 2100  Med List Status: Not Addressed   Medication Last Dose Status   diclofenac DR (VOLTAREN) 75 MG Tablet Delayed Response  Active   methocarbamol (ROBAXIN) 500 MG Tab  Active                ALLERGIES  Allergies   Allergen Reactions   • Tramadol Shortness of Breath     Acid refulx       PHYSICAL EXAM  VITAL SIGNS: /80   Pulse 79   Temp 36.8 °C (98.2 °F) (Temporal)   Resp 16   Ht 1.829 m (6')   Wt 109 kg (240 lb)   SpO2 93%   BMI 32.55 kg/m²    Oxygen saturation is interpreted as adequate  Constitutional: Awake verbal he does not appear toxic or distressed  Eyes: No erythema discharge or jaundice  Neck: Trachea midline no JVD  Cardiovascular: Regular rate and rhythm  Lungs: Clear and equal bilaterally  Skin: Warm and dry  Musculoskeletal: No leg edema or calf tenderness  Neurologic: Awake lucid verbal moving all extremities without difficulty    Laboratory  Covid and influenza testing was  sent to the laboratory results will be available within 24 hours    Radiology  DX-CHEST-PORTABLE (1 VIEW)   Final Result         1.  Trace left pleural effusion, otherwise no acute cardiopulmonary disease.          MEDICAL DECISION MAKING and DISPOSITION  In the emergency department the patient clinically looks well I reviewed the results with him and I have advised him that he should assume he has a Covid infection until he has a negative test result and should quarantine at home and wear a mask.  The patient is to check the [facility] website tomorrow for his Covid and viral test results.  He understands that if the Covid testing is positive he will need to quarantine for 14 days and follow-up with his primary care doctor before returning to regular activities.  If he feels he is having new or worsening symptoms he is to return here for recheck.  I have recommended that the patient discontinue his use of vaping products and marijuana    IMPRESSION  1.  Upper respiratory tract infection  2.  Suspect COVID-19 infection    Electronically signed by: Rehan Kincaid M.D., 10/24/2021 11:35 PM

## 2021-12-29 ENCOUNTER — OFFICE VISIT (OUTPATIENT)
Dept: SLEEP MEDICINE | Facility: MEDICAL CENTER | Age: 31
End: 2021-12-29
Payer: COMMERCIAL

## 2021-12-29 VITALS
HEART RATE: 76 BPM | DIASTOLIC BLOOD PRESSURE: 84 MMHG | HEIGHT: 73 IN | BODY MASS INDEX: 32.07 KG/M2 | RESPIRATION RATE: 16 BRPM | OXYGEN SATURATION: 94 % | SYSTOLIC BLOOD PRESSURE: 128 MMHG | WEIGHT: 242 LBS

## 2021-12-29 DIAGNOSIS — R06.02 SHORTNESS OF BREATH: ICD-10-CM

## 2021-12-29 DIAGNOSIS — G47.30 SLEEP DISORDER BREATHING: ICD-10-CM

## 2021-12-29 PROCEDURE — 99203 OFFICE O/P NEW LOW 30 MIN: CPT | Performed by: FAMILY MEDICINE

## 2021-12-29 ASSESSMENT — FIBROSIS 4 INDEX: FIB4 SCORE: 0.4

## 2021-12-29 NOTE — PROGRESS NOTES
"    [facility]  Consult Note     Date: 12/29/2021 / Time: 11:03 AM    Patient ID:   Name:             Miky Khanna   YOB: 1990  Age:                 31 y.o.  male   MRN:               [id number]      Thank you for requesting a sleep medicine consultation on Miky Khanna at the sleep center.He presents today with the chief complaints of snoring, pause in breathing and bruxism. He is referred by Dr. river for evaluation and treatment of sleep disorder breathing.     HISTORY OF PRESENT ILLNESS:       At night,  Miky Khanna goes to bed around 10 pm on weekdays and around 10 pm-2am on the weekends. He gets out of bed at 5 am on weekdays and at 7-8 am on the weekends. He averages about 8 hrs of sleep on a good night and 6 hrs on a bad night. He falls asleep within few minutes. He rarely wakes up middle of the night. He is aware of snoring and breathing pauses in sleep.he denies any symptoms of restless legs syndrome such as an \"urge to move\"  He  legs in the evening or bedtime. He  denies any symptoms of narcolepsy such as sleep paralysis or cataplexy, or any symptoms to suggest parasomnias such as sleep walking or acting out of dreams. He  has not used any medications for the sleep problem.    Overall, he does not finds his sleep refreshing. In terms of  excessive daytime sleepiness,  He complains of sleepiness while  at work, playing video games and occasionally while driving. He does not take regular naps. He drinks about 2 cups of coffee and 1 red bull per day.    Patient also complains about shortness of breath.  He has smoked from age 14 to age 26. He use to smoke 1/2 pack a day.  However he has been smoking marijuana since age 14. He had COVID 12/2021. He was seen in the ER in 10/2021 due to shortness of breath and possible plueuratic chest pain.  However the chest pain has been going on intermittently since he was 16 years old.  As per patient he had extensive cardiac work-up " at that time which was unremarkable. The CXR on 10/24/21 which showed  Trace left pleural effusion, otherwise no acute cardiopulmonary disease. He has been having SOB for last last couple years even at rest however it is worse with exertion. He is not on any inhalers.  Denies any previous pulmonary work-up.    REVIEW OF SYSTEMS:       Constitutional: Denies fevers, Denies weight changes  Eyes: Denies changes in vision, no eye pain  Ears/Nose/Throat/Mouth: Denies nasal congestion or sore throat   Cardiovascular: Denies chest pain or palpitations   Respiratory: Denies shortness of breath , Denies cough  Gastrointestinal/Hepatic: Denies abdominal pain, nausea, vomiting, diarrhea, constipation or GI bleeding   Genitourinary: Denies bladder dysfunction, dysuria or frequency  Musculoskeletal/Rheum: Denies  joint pain and swelling   Skin/Breast: Denies rash  Neurological: Denies headache, confusion, memory loss or focal weakness/parasthesias  Psychiatric: denies mood disorder     Comprehensive review of systems form is reviewed with the patient and is attached in the EMR.     PMH:  has a past medical history of ADHD, Anxiety, Chickenpox, and Substance abuse (HCC). He also has no past medical history of Sami measles.  MEDS:   Current Outpatient Medications:   •  diclofenac DR (VOLTAREN) 75 MG Tablet Delayed Response, Take 75 mg by mouth 2 times a day. As needed, Disp: , Rfl:   •  methocarbamol (ROBAXIN) 500 MG Tab, Take 1 to 2 tablets every 6 hours as needed for pain/muscle spasms. (Patient not taking: Reported on 12/29/2021), Disp: 40 tablet, Rfl: 0  ALLERGIES:   Allergies   Allergen Reactions   • Tramadol Shortness of Breath     Acid refulx     SURGHX: History reviewed. No pertinent surgical history.  SOCHX:  reports that he quit smoking about 4 years ago. He smoked 0.25 packs per day. He has never used smokeless tobacco. He reports current alcohol use. He reports current drug use. Drugs: Marijuana and Inhaled.   FH:  "  Family History   Problem Relation Age of Onset   • Psychiatric Illness Father         suicide   • Cancer Brother         lung cancer, 43   • Alcohol/Drug Brother         meth, heroine   • Cancer Maternal Grandfather         lung cancer   • Hypertension Mother    • Diabetes Neg Hx    • Heart Disease Neg Hx        Physical Exam:  Vitals/ General Appearance:   Weight/BMI: Body mass index is 32.15 kg/m².  /84 (BP Location: Left arm, Patient Position: Sitting, BP Cuff Size: Adult)   Pulse 76   Resp 16   Ht 1.848 m (6' 0.75\")   Wt 110 kg (242 lb)   SpO2 94%   Vitals:    12/29/21 1057   BP: 128/84   BP Location: Left arm   Patient Position: Sitting   BP Cuff Size: Adult   Pulse: 76   Resp: 16   SpO2: 94%   Weight: 110 kg (242 lb)   Height: 1.848 m (6' 0.75\")           Constitutional: Alert, no distress, well-groomed.  Skin: No rashes in visible areas.  Eye: Round. Conjunctiva clear, lids normal. No icterus.   ENMT: Lips pink without lesions, good dentition, moist mucous membranes. Phonation normal.  Neck: No masses, no thyromegaly. Moves freely without pain.  CV: Pulse as reported by patient  Respiratory: Unlabored respiratory effort, no cough or audible wheeze  Psych: Alert and oriented x3, normal affect and mood.     ASSESSMENT AND PLAN     1. He  has symptoms of Obstructive Sleep Apnea (CHRISTY). The pathophysiology of CHRISTY and the increased risk of cardiovascular morbidity from untreated CHRISTY is discussed in detail with the patient.    We have discussed diagnostic options including in-laboratory, attended polysomnography and home sleep testing. We have also discussed treatment options including airway pressurization, reconstructive otolaryngologic surgery, dental appliances and weight management.       Subsequently,treatment options will be discussed based on the diagnostic study. Meanwhile, He is urged to avoid supine sleep, weight gain and alcoholic beverages since all of these can worsen CHRISTY. He is cautioned " against drowsy driving. If He feels sleepy while driving, He must pull over for a break/nap, rather than persist on the road, in the interest of He own safety and that of others on the road.    Plan  -  He  will be scheduled for an overnight HST to assess sleep related breathing disorder.    2.  Shortness of breath: Likely due to your extensive history of smoking cigarettes and marijuana.  Chest x-ray was suggestive of pleural effusion which could be there is reason COVID-19 infection.  Patient is referred to pulmonary for further treatment and evaluation.    Regarding treatment of other past medical problems and general health maintenance,  He is urged to follow up with PCP.

## 2021-12-30 ENCOUNTER — OFFICE VISIT (OUTPATIENT)
Dept: SLEEP MEDICINE | Facility: MEDICAL CENTER | Age: 31
End: 2021-12-30
Payer: COMMERCIAL

## 2021-12-30 ENCOUNTER — HOSPITAL ENCOUNTER (OUTPATIENT)
Dept: LAB | Facility: MEDICAL CENTER | Age: 31
End: 2021-12-30
Attending: INTERNAL MEDICINE
Payer: COMMERCIAL

## 2021-12-30 VITALS
SYSTOLIC BLOOD PRESSURE: 102 MMHG | WEIGHT: 242.56 LBS | HEART RATE: 67 BPM | HEIGHT: 73 IN | OXYGEN SATURATION: 96 % | BODY MASS INDEX: 32.15 KG/M2 | DIASTOLIC BLOOD PRESSURE: 64 MMHG

## 2021-12-30 DIAGNOSIS — R07.89 COSTOCHONDRAL CHEST PAIN: ICD-10-CM

## 2021-12-30 DIAGNOSIS — R07.9 CHEST PAIN, UNSPECIFIED TYPE: ICD-10-CM

## 2021-12-30 DIAGNOSIS — R06.09 DYSPNEA ON EXERTION: ICD-10-CM

## 2021-12-30 DIAGNOSIS — F12.90 MARIJUANA USE: ICD-10-CM

## 2021-12-30 LAB
CK SERPL-CCNC: 101 U/L (ref 0–154)
CRP SERPL HS-MCNC: 0.55 MG/DL (ref 0–0.75)
ERYTHROCYTE [SEDIMENTATION RATE] IN BLOOD BY WESTERGREN METHOD: 3 MM/HOUR (ref 0–20)
RHEUMATOID FACT SER IA-ACNC: <10 IU/ML (ref 0–14)

## 2021-12-30 PROCEDURE — 99204 OFFICE O/P NEW MOD 45 MIN: CPT | Performed by: INTERNAL MEDICINE

## 2021-12-30 PROCEDURE — 86200 CCP ANTIBODY: CPT

## 2021-12-30 PROCEDURE — 85652 RBC SED RATE AUTOMATED: CPT

## 2021-12-30 PROCEDURE — 82550 ASSAY OF CK (CPK): CPT

## 2021-12-30 PROCEDURE — 86431 RHEUMATOID FACTOR QUANT: CPT

## 2021-12-30 PROCEDURE — 36415 COLL VENOUS BLD VENIPUNCTURE: CPT

## 2021-12-30 PROCEDURE — 86038 ANTINUCLEAR ANTIBODIES: CPT

## 2021-12-30 PROCEDURE — 86140 C-REACTIVE PROTEIN: CPT

## 2021-12-30 RX ORDER — IBUPROFEN 600 MG/1
600 TABLET ORAL EVERY 8 HOURS PRN
Qty: 30 TABLET | Refills: 3 | Status: SHIPPED | OUTPATIENT
Start: 2021-12-30 | End: 2023-01-04

## 2021-12-30 ASSESSMENT — ENCOUNTER SYMPTOMS
MYALGIAS: 0
FEVER: 0
SHORTNESS OF BREATH: 1
NAUSEA: 0
FOCAL WEAKNESS: 0
DEPRESSION: 1
SPUTUM PRODUCTION: 0
SINUS PAIN: 0
SORE THROAT: 0
WEIGHT LOSS: 0
EYE DISCHARGE: 0
DIZZINESS: 0
HALLUCINATIONS: 0
ORTHOPNEA: 0
COUGH: 0
STRIDOR: 0
VOMITING: 0
HEADACHES: 0
SENSORY CHANGE: 0
LOSS OF CONSCIOUSNESS: 0
WHEEZING: 0
EYE PAIN: 0
ABDOMINAL PAIN: 0
NERVOUS/ANXIOUS: 0
CHILLS: 0
DIARRHEA: 0

## 2021-12-30 ASSESSMENT — LIFESTYLE VARIABLES: SUBSTANCE_ABUSE: 1

## 2021-12-30 ASSESSMENT — FIBROSIS 4 INDEX: FIB4 SCORE: 0.4

## 2021-12-30 NOTE — PROGRESS NOTES
"Pulmonary Clinic- Initial Consult    Date of Service: 12/30/2021    Referring Physician: Mina Fox M.D.    Reason for Consult: Shortness of breath, chest pain    Chief Complaint:   Chief Complaint   Patient presents with   • Establish Care     Referred by    • Other     CXR 10/24/21     HPI:   Miky Khanna is a very pleasant 31 y.o. male former tobacco smoker, 7 pack years, quit 2016 who is followed by Mina Fox M.D. and is referred to the pulmonary clinic for shortness of breath.    Miky was initially referred to sleep medicine for suspected sleep disordered breathing with snoring, pausing breathing and sleeping.  He was seen by Dr. Fox yesterday. Plan for overnight HST.     During his consultation he reported dyspnea on exertion and stabbing chest pain since he was 14 years old, which recently has been becoming more frequent.  He localizes this pain over the left peristernal area, 10 out of 10 in severity. It is spontaneous, not related with exertion. He states over the last couple months it has now been radiating \"spider\" like fashion across his chest.  Dyspnea, wheezing and coughing with any aerobic activity.    He denies any breathing issues when he was a child.  Started smoking cigarettes and marijuana from [age]yoa, still vapes marijuana several nights a week.  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  Not vaccinated against Covid, states he had Covid earlier this month however no change in symptoms.  Was seen in the ER for pleuritic chest pain in 10/2021, CXR revealed trace left pleural effusion.    Has not been seen by pulmonologist in the past.  No prior PFTs.    MMRC Grade: 0: Breathless only during strenuous exercise    Past Medical History:   Diagnosis Date   • ADHD    • Anxiety    • Chickenpox    • Cough    • Painful breathing    • Shortness of breath    • Sputum production    • Substance abuse (HCC)    • Wheezing        History reviewed. No pertinent " surgical history.    Social History     Socioeconomic History   • Marital status: Single     Spouse name: Not on file   • Number of children: Not on file   • Years of education: Not on file   • Highest education level: Not on file   Occupational History   • Not on file   Tobacco Use   • Smoking status: Former Smoker     Packs/day: 0.50     Years: 12.00     Pack years: 6.00     Types: Cigarettes     Quit date: 2017     Years since quittin.9   • Smokeless tobacco: Never Used   Vaping Use   • Vaping Use: Some days   • Substances: THC, slight   Substance and Sexual Activity   • Alcohol use: Yes     Comment: rare   • Drug use: Yes     Types: Marijuana, Inhaled     Comment: marijuana-   • Sexual activity: Yes     Partners: Female     Comment: no STD, two children   Other Topics Concern   • Not on file   Social History Narrative   • Not on file     Social Determinants of Health     Financial Resource Strain:    • Difficulty of Paying Living Expenses: Not on file   Food Insecurity:    • Worried About Running Out of Food in the Last Year: Not on file   • Ran Out of Food in the Last Year: Not on file   Transportation Needs:    • Lack of Transportation (Medical): Not on file   • Lack of Transportation (Non-Medical): Not on file   Physical Activity:    • Days of Exercise per Week: Not on file   • Minutes of Exercise per Session: Not on file   Stress:    • Feeling of Stress : Not on file   Social Connections:    • Frequency of Communication with Friends and Family: Not on file   • Frequency of Social Gatherings with Friends and Family: Not on file   • Attends Yazidi Services: Not on file   • Active Member of Clubs or Organizations: Not on file   • Attends Club or Organization Meetings: Not on file   • Marital Status: Not on file   Intimate Partner Violence:    • Fear of Current or Ex-Partner: Not on file   • Emotionally Abused: Not on file   • Physically Abused: Not on file   • Sexually Abused: Not on file   Housing  "Stability:    • Unable to Pay for Housing in the Last Year: Not on file   • Number of Places Lived in the Last Year: Not on file   • Unstable Housing in the Last Year: Not on file          Family History   Problem Relation Age of Onset   • Psychiatric Illness Father         suicide   • Cancer Brother         lung cancer, 43   • Alcohol/Drug Brother         meth, heroine   • Cancer Maternal Grandfather         lung cancer   • Hypertension Mother    • Diabetes Neg Hx    • Heart Disease Neg Hx        Current Outpatient Medications on File Prior to Visit   Medication Sig Dispense Refill   • methocarbamol (ROBAXIN) 500 MG Tab Take 1 to 2 tablets every 6 hours as needed for pain/muscle spasms. (Patient not taking: Reported on 12/29/2021) 40 tablet 0     No current facility-administered medications on file prior to visit.       Allergies: Tramadol      ROS:   Review of Systems   Constitutional: Negative for chills, fever and weight loss.   HENT: Negative for congestion, sinus pain and sore throat.    Eyes: Negative for pain and discharge.   Respiratory: Positive for shortness of breath. Negative for cough, sputum production, wheezing and stridor.    Cardiovascular: Positive for chest pain. Negative for orthopnea and leg swelling.   Gastrointestinal: Negative for abdominal pain, diarrhea, nausea and vomiting.   Genitourinary: Negative for dysuria, frequency and urgency.   Musculoskeletal: Negative for myalgias.   Skin: Negative for rash.   Neurological: Negative for dizziness, sensory change, focal weakness, loss of consciousness and headaches.   Psychiatric/Behavioral: Positive for depression and substance abuse. Negative for hallucinations and suicidal ideas. The patient is not nervous/anxious.    All other systems reviewed and are negative.    Vitals:  /64 (BP Location: Left arm, Patient Position: Sitting, BP Cuff Size: Large adult)   Pulse 67   Ht 1.848 m (6' 0.75\")   Wt 110 kg (242 lb 9 oz)   SpO2 96% "     Physical Exam:  Physical Exam  Vitals and nursing note reviewed.   Constitutional:       General: He is not in acute distress.     Appearance: He is well-developed. He is not diaphoretic.      Comments: Very pleasant   HENT:      Nose: Nose normal.      Mouth/Throat:      Pharynx: No oropharyngeal exudate.   Eyes:      General: No scleral icterus.        Right eye: No discharge.         Left eye: No discharge.      Conjunctiva/sclera: Conjunctivae normal.      Pupils: Pupils are equal, round, and reactive to light.   Neck:      Thyroid: No thyromegaly.      Vascular: No JVD.      Trachea: No tracheal deviation.   Cardiovascular:      Rate and Rhythm: Normal rate and regular rhythm.      Heart sounds: Normal heart sounds. No murmur heard.      Pulmonary:      Effort: Pulmonary effort is normal. No respiratory distress.      Breath sounds: Normal breath sounds. No stridor. No wheezing or rales.   Abdominal:      General: There is no distension.      Palpations: Abdomen is soft.      Tenderness: There is no abdominal tenderness. There is no guarding.   Musculoskeletal:         General: No tenderness. Normal range of motion.      Cervical back: Neck supple.   Lymphadenopathy:      Cervical: No cervical adenopathy.   Skin:     General: Skin is warm and dry.      Capillary Refill: Capillary refill takes less than 2 seconds.      Coloration: Skin is not pale.      Findings: No erythema.   Neurological:      Mental Status: He is alert and oriented to person, place, and time.      Sensory: No sensory deficit.      Motor: No abnormal muscle tone.      Coordination: Coordination normal.      Deep Tendon Reflexes: Reflexes normal.   Psychiatric:         Behavior: Behavior normal.         Thought Content: Thought content normal.         Judgment: Judgment normal.       Laboratory Data:    PFTs as reviewed by me personally show:  None for review at time of consultation     imaging as reviewed by me personally show:    CXR  shows trace effusion on the left.    Assessment/Plan:    Problem List Items Addressed This Visit     Dyspnea on exertion     Suspect exercise-induced asthma  Advised to discontinue marijuana use entirely  Check PFTs         Relevant Orders    PULMONARY FUNCTION TESTS -Test requested: Complete Pulmonary Function Test    Sed Rate (Completed)    CRP QUANTITIVE (NON-CARDIAC)    RHEUMATOID ARTHRITIS FACTOR (Completed)    CCP    MICHELLE REFLEXIVE PROFILE    Costochondral chest pain     Rx IBU  Reports polyarthralgias, will check ESR, CRP, RF/CCP, MICHELLE         Relevant Medications    ibuprofen (MOTRIN) 600 MG Tab    Other Relevant Orders    PULMONARY FUNCTION TESTS -Test requested: Complete Pulmonary Function Test    Sed Rate (Completed)    CRP QUANTITIVE (NON-CARDIAC)    RHEUMATOID ARTHRITIS FACTOR (Completed)    CCP    MICHELLE REFLEXIVE PROFILE    CREATINE KINASE (Completed)    Marijuana use     Counseled about avoiding inhalational/vaping  Advised to abstain or switch to edibles, etc         Chest pain    Relevant Orders    PULMONARY FUNCTION TESTS -Test requested: Complete Pulmonary Function Test    Sed Rate (Completed)    CRP QUANTITIVE (NON-CARDIAC)    RHEUMATOID ARTHRITIS FACTOR (Completed)    CCP    MICHELLE REFLEXIVE PROFILE         Return in about 2 months (around 2/28/2022).     This note was generated using voice recognition software which has a chance of producing errors of grammar and possibly content.  I have made every reasonable attempt to find and correct any obvious errors, but it should be expected that some may not be found prior to finalization of this note.    Time spent in record review prior to patient arrival, reviewing results, and in face-to-face encounter totaled 47 min, excluding any procedures if performed.  __________  Willis Velasco MD  Pulmonary and Critical Care Medicine  [facility]

## 2022-01-01 LAB
CCP IGG SERPL-ACNC: 4 UNITS (ref 0–19)
NUCLEAR IGG SER QL IA: NORMAL

## 2022-08-18 ENCOUNTER — OFFICE VISIT (OUTPATIENT)
Dept: MEDICAL GROUP | Facility: PHYSICIAN GROUP | Age: 32
End: 2022-08-18
Payer: COMMERCIAL

## 2022-08-18 VITALS
DIASTOLIC BLOOD PRESSURE: 70 MMHG | BODY MASS INDEX: 32.78 KG/M2 | HEIGHT: 72 IN | TEMPERATURE: 97.7 F | SYSTOLIC BLOOD PRESSURE: 110 MMHG | RESPIRATION RATE: 20 BRPM | WEIGHT: 242 LBS | HEART RATE: 74 BPM | OXYGEN SATURATION: 97 %

## 2022-08-18 DIAGNOSIS — A63.0 GENITAL WARTS: ICD-10-CM

## 2022-08-18 DIAGNOSIS — Z11.3 SCREENING EXAMINATION FOR SEXUALLY TRANSMITTED DISEASE: ICD-10-CM

## 2022-08-18 PROCEDURE — 99214 OFFICE O/P EST MOD 30 MIN: CPT | Performed by: NURSE PRACTITIONER

## 2022-08-18 RX ORDER — PODOFILOX 5 MG/G
1 GEL TOPICAL 2 TIMES DAILY
Qty: 3.5 G | Refills: 1 | Status: SHIPPED | OUTPATIENT
Start: 2022-08-18 | End: 2023-01-04

## 2022-08-18 RX ORDER — DEXTROAMPHETAMINE SACCHARATE, AMPHETAMINE ASPARTATE MONOHYDRATE, DEXTROAMPHETAMINE SULFATE AND AMPHETAMINE SULFATE 3.75; 3.75; 3.75; 3.75 MG/1; MG/1; MG/1; MG/1
15 CAPSULE, EXTENDED RELEASE ORAL DAILY
COMMUNITY
Start: 2022-08-10

## 2022-08-18 ASSESSMENT — FIBROSIS 4 INDEX: FIB4 SCORE: 0.4

## 2022-08-18 ASSESSMENT — PATIENT HEALTH QUESTIONNAIRE - PHQ9: CLINICAL INTERPRETATION OF PHQ2 SCORE: 0

## 2022-08-19 NOTE — PROGRESS NOTES
Subjective  Chief Complaint  Genital Warts    History of Present Illness  Miky Khanna is a 31 y.o. male. This established patient is here today to discuss genital warts.    His PCP is Dr. Ronnie Hernandez.    Genital warts  Chronic and ongoing. He has been having issues with recurrent genital warts. He tried to use OTC treatment but it did not work. He states that years ago he did have them frozen off at the [facility]. He is wanting to discuss treatment options today.    Past Medical History    Allergies: Tramadol  Past Medical History:   Diagnosis Date    ADHD     Anxiety     Chickenpox     Cough     Painful breathing     Shortness of breath     Sputum production     Substance abuse (HCC)     Wheezing      History reviewed. No pertinent surgical history.  Current Outpatient Medications Ordered in Epic   Medication Sig Dispense Refill    podofilox (CONDYLOX) 0.5 % gel Apply 1 Application topically 2 times a day. 3.5 g 1    amphetamine-dextroamphetamine (ADDERALL XR) 15 MG XR capsule Take 15 mg by mouth every day.      ibuprofen (MOTRIN) 600 MG Tab Take 1 Tablet by mouth every 8 hours as needed for Moderate Pain. 30 Tablet 3     No current Epic-ordered facility-administered medications on file.     Family History:    Family History   Problem Relation Age of Onset    Psychiatric Illness Father         suicide    Cancer Brother         lung cancer, 43    Alcohol/Drug Brother         meth, heroine    Cancer Maternal Grandfather         lung cancer    Hypertension Mother     Diabetes Neg Hx     Heart Disease Neg Hx       Personal/Social History:    Social History     Tobacco Use    Smoking status: Former     Packs/day: 0.50     Years: 12.00     Pack years: 6.00     Types: Cigarettes     Quit date: 2017     Years since quittin.5    Smokeless tobacco: Never   Vaping Use    Vaping Use: Some days    Substances: THC, slight    Devices: Disposable   Substance Use Topics    Alcohol use: Yes     Comment:  rare    Drug use: Yes     Types: Marijuana, Inhaled     Comment: marijuana-     Social History     Social History Narrative    Not on file      Review of Systems:   General: Negative for fever/chills and unexpected weight change.   Respiratory:  Negative for cough and dyspnea.    Cardiovascular:  Negative for chest pain and palpitations.  Musculoskeletal:  Negative for myalgias.   Skin: Positive for genital warts.    Objective  Physical Exam:   /70 (BP Location: Left arm, Patient Position: Sitting, BP Cuff Size: Large adult)   Pulse 74   Temp 36.5 °C (97.7 °F) (Temporal)   Resp 20   Ht 1.829 m (6')   Wt 110 kg (242 lb)   SpO2 97%  Body mass index is 32.82 kg/m².  General:  Alert and oriented.  Well appearing.  NAD  Neck: Supple without JVD. No lymphadenopathy.  Pulmonary:  Normal effort.  Clear to ausculation without rales, ronchi, or wheezing.  Cardiovascular:  Regular rate and rhythm without murmur, rubs or gallop.   Skin: He deferred exam of genital warts.  Musculoskeletal:  No extremity cyanosis, clubbing, or edema.      Assessment/Plan  1. Genital warts  Chronic and ongoing.  Discussed Podofilox gel risks, benefits and side effects, he verbalized understanding.  Discussed to apply gel twice a day for 3 days and then not apply get for 4 days. He may repeat this for up to 4 weeks, he verbalized understanding.  - podofilox (CONDYLOX) 0.5 % gel; Apply 1 Application topically 2 times a day.  Dispense: 3.5 g; Refill: 1    2. Screening examination for sexually transmitted disease  He is requesting STI testing at this time.  - Chlamydia/GC, PCR (Urine); Future  - HIV AG/AB Combo Assay Screening; Future  - T.Pallidum AB EIA; Future  - Trichomonas Vaginalis by TMA; Future  - Hepatitis C Virus Antibody; Future  - HEP B Surface Antibody; Future  - Hep B Core AB Total; Future  - Hep B Surface Antigen; Future      Health Maintenance: Completed    Return if symptoms worsen or fail to improve.    Please note that  this dictation was created using voice recognition software. I have made every reasonable attempt to correct obvious errors, but I expect that there are errors of grammar and possibly content that I did not discover before finalizing the note.    MISA Pierce  [facility]

## 2022-08-19 NOTE — ASSESSMENT & PLAN NOTE
Chronic and ongoing. He has been having issues with recurrent genital warts. He tried to use OTC treatment but it did not work. He states that years ago he did have them frozen off at the [facility]. He is wanting to discuss treatment options today.

## 2023-01-04 ENCOUNTER — OFFICE VISIT (OUTPATIENT)
Dept: MEDICAL GROUP | Facility: PHYSICIAN GROUP | Age: 33
End: 2023-01-04
Payer: COMMERCIAL

## 2023-01-04 ENCOUNTER — APPOINTMENT (OUTPATIENT)
Dept: RADIOLOGY | Facility: IMAGING CENTER | Age: 33
End: 2023-01-04
Attending: FAMILY MEDICINE
Payer: COMMERCIAL

## 2023-01-04 VITALS
SYSTOLIC BLOOD PRESSURE: 118 MMHG | OXYGEN SATURATION: 94 % | WEIGHT: 253 LBS | HEART RATE: 103 BPM | HEIGHT: 73 IN | DIASTOLIC BLOOD PRESSURE: 68 MMHG | RESPIRATION RATE: 24 BRPM | BODY MASS INDEX: 33.53 KG/M2 | TEMPERATURE: 97.7 F

## 2023-01-04 DIAGNOSIS — K52.9 CHRONIC DIARRHEA: ICD-10-CM

## 2023-01-04 DIAGNOSIS — M54.6 ACUTE MIDLINE THORACIC BACK PAIN: ICD-10-CM

## 2023-01-04 PROCEDURE — 99214 OFFICE O/P EST MOD 30 MIN: CPT | Performed by: FAMILY MEDICINE

## 2023-01-04 PROCEDURE — 72100 X-RAY EXAM L-S SPINE 2/3 VWS: CPT | Mod: TC | Performed by: NURSE PRACTITIONER

## 2023-01-04 PROCEDURE — 72070 X-RAY EXAM THORAC SPINE 2VWS: CPT | Mod: TC | Performed by: NURSE PRACTITIONER

## 2023-01-04 RX ORDER — KETOROLAC TROMETHAMINE 30 MG/ML
30 INJECTION, SOLUTION INTRAMUSCULAR; INTRAVENOUS ONCE
Status: COMPLETED | OUTPATIENT
Start: 2023-01-04 | End: 2023-01-04

## 2023-01-04 RX ORDER — OXYCODONE HYDROCHLORIDE 5 MG/1
5 TABLET ORAL EVERY 4 HOURS PRN
Qty: 5 TABLET | Refills: 0 | Status: SHIPPED | OUTPATIENT
Start: 2023-01-04 | End: 2023-01-09

## 2023-01-04 RX ORDER — CYCLOBENZAPRINE HCL 10 MG
10 TABLET ORAL 3 TIMES DAILY PRN
Qty: 30 TABLET | Refills: 0 | Status: SHIPPED | OUTPATIENT
Start: 2023-01-04 | End: 2024-01-18 | Stop reason: SDUPTHER

## 2023-01-04 RX ADMIN — KETOROLAC TROMETHAMINE 30 MG: 30 INJECTION, SOLUTION INTRAMUSCULAR; INTRAVENOUS at 11:34

## 2023-01-04 ASSESSMENT — FIBROSIS 4 INDEX: FIB4 SCORE: 0.41

## 2023-01-04 NOTE — LETTER
[facility]  [address]  [place] [address]     January 4, 2023    Patient: Miky Khanna   YOB: 1990   Date of Visit: 1/4/2023       To Whom It May Concern:    Miky Khanna was seen and treated in our department on 1/4/2023.     Please excuse him from work from 1/4/23 through 1/6/23.    Sincerely,     Chance Damon M.D.

## 2023-01-04 NOTE — LETTER
January 4, 2023    To Whom It May Concern:         This is confirmation that Miky Khanna attended his scheduled appointment with Chance Damon M.D. on 1/04/23. Please excuse him from work on this day.    Sincerely,          Chance Damon M.D.  [phone]

## 2023-01-04 NOTE — PROGRESS NOTES
"CHIEF COMPLAINT / REASON FOR VISIT  Miky Khanna is a 32 y.o. male that presents today to [facility] care.    HISTORY OF PRESENT ILLNESS  2022 picked up cherry  and strained his back, since then has had right low back pain aggravated by torso flexion, has limited his ability to exercise.  Yesterday slipped on ice, fell onto left side. After the fall he has been experiencing low-thoracic/high lumbar midline severe pain 7-8/10 at all times.  Denies any radicular radiating pain or lower extremity weakness. No bowel or bladder dysfunction, but does report some burning abdominal pain after eating.  Tried tizanidine 4 mg which didn't help, tylenol 1000 mg, and aleve which helped for approximately 2-3 hours. Wasn't able to sleep last night due to the pain      History of internal hemorrhoids. No solid bowel movement in 6 months, reports bowel movements 1-2 times per day.     Social History     Tobacco Use    Smoking status: Former     Packs/day: 0.50     Years: 12.00     Pack years: 6.00     Types: Cigarettes     Quit date: 2017     Years since quittin.9    Smokeless tobacco: Never   Vaping Use    Vaping Use: Some days    Substances: THC, slight    Devices: Disposable   Substance Use Topics    Alcohol use: Yes     Comment: rare    Drug use: Yes     Types: Marijuana, Inhaled     Comment: marijuana-     OBJECTIVE    /68 (BP Location: Right arm, Patient Position: Sitting, BP Cuff Size: Adult)   Pulse (!) 103   Temp 36.5 °C (97.7 °F) (Temporal)   Resp (!) 24   Ht 1.854 m (6' 1\")   Wt 115 kg (253 lb)   SpO2 94%   BMI 33.38 kg/m²  Body mass index is 33.38 kg/m².    PHYSICAL EXAM  Constitutional: Sitting comfortably, in no acute distress, responds to questions appropriately.  Back:  No obvious scoliosis or deformity. Tenderness of low thoracic/high lumbar paraspinal muscles, right worse than left.  Sensation intact to light touch in all lower extremity dermatomes. 5/5 strength bilateral lower " extremities. Normal symmetric gait    ASSESSMENT & PLAN  1. Acute midline thoracic back pain  Radiographs negative for fracture or malalignment. His pain is significantly improved after toradol 30 mg IM injection. Suspect paraspinal muscle strain, no indication for further imaging or intervention. He has minimal grade 1 retrolisthesis and mild spondylosis L3-L5 which could be contributing to his more chronic pain. To help with sleep at night I prescribed 5 tablets of oxycodone 5 mg, also flexeril for muscle relaxant.  - DX-LUMBAR SPINE-2 OR 3 VIEWS; Future  - DX-THORACIC SPINE-2 VIEWS; Future  - oxyCODONE immediate-release (ROXICODONE) 5 MG Tab; Take 1 Tablet by mouth every four hours as needed for Severe Pain for up to 5 days. Indications: Acute Pain  Dispense: 5 Tablet; Refill: 0    2. Chronic diarrhea  Reports daily diarrhea/loose stools for 6 months, desires gastroenterology referral and possibly colonoscopy  - Referral to Gastroenterology

## 2023-05-01 ENCOUNTER — OFFICE VISIT (OUTPATIENT)
Dept: URGENT CARE | Facility: PHYSICIAN GROUP | Age: 33
End: 2023-05-01
Payer: COMMERCIAL

## 2023-05-01 VITALS
HEART RATE: 76 BPM | TEMPERATURE: 98.3 F | SYSTOLIC BLOOD PRESSURE: 114 MMHG | OXYGEN SATURATION: 96 % | BODY MASS INDEX: 32.54 KG/M2 | DIASTOLIC BLOOD PRESSURE: 76 MMHG | WEIGHT: 245.5 LBS | HEIGHT: 73 IN | RESPIRATION RATE: 16 BRPM

## 2023-05-01 DIAGNOSIS — R05.9 COUGH, UNSPECIFIED TYPE: ICD-10-CM

## 2023-05-01 DIAGNOSIS — J02.9 SORE THROAT: ICD-10-CM

## 2023-05-01 LAB — S PYO DNA SPEC NAA+PROBE: NOT DETECTED

## 2023-05-01 PROCEDURE — 99213 OFFICE O/P EST LOW 20 MIN: CPT

## 2023-05-01 PROCEDURE — 87651 STREP A DNA AMP PROBE: CPT

## 2023-05-01 RX ORDER — BENZONATATE 100 MG/1
100 CAPSULE ORAL 3 TIMES DAILY PRN
Qty: 60 CAPSULE | Refills: 0 | Status: SHIPPED | OUTPATIENT
Start: 2023-05-01 | End: 2023-05-01 | Stop reason: CLARIF

## 2023-05-01 RX ORDER — DICLOFENAC SODIUM 75 MG/1
75 TABLET, DELAYED RELEASE ORAL 2 TIMES DAILY
Qty: 20 TABLET | Refills: 0 | Status: SHIPPED | OUTPATIENT
Start: 2023-05-01 | End: 2023-05-11

## 2023-05-01 RX ORDER — LIDOCAINE HYDROCHLORIDE 20 MG/ML
15 SOLUTION OROPHARYNGEAL PRN
Qty: 100 ML | Refills: 0 | Status: SHIPPED | OUTPATIENT
Start: 2023-05-01

## 2023-05-01 ASSESSMENT — ENCOUNTER SYMPTOMS
SORE THROAT: 1
SPUTUM PRODUCTION: 1
FEVER: 1
COUGH: 1
MYALGIAS: 0

## 2023-05-01 NOTE — LETTER
May 1, 2023    To Whom It May Concern:         This is confirmation that Miky Eddysage Khanna attended his scheduled appointment with Kassidy Colon P.A.-C. on 5/01/23. Please excuse his absence from work this evening. He may return to work in 1-3 days if feeling well.          If you have any questions please do not hesitate to call me at the phone number listed below.    Sincerely,          Kassidy Colon P.A.-C.  [phone]

## 2023-05-01 NOTE — PROGRESS NOTES
Subjective:     CHIEF COMPLAINT  Chief Complaint   Patient presents with    Pharyngitis     Cough,tonsils swollen, painful swallowing, fever x 2 days       HPI  Miky Khanna is a very pleasant 32 y.o. male who presents with a sore throat for 2 days.  He reports his daughter frequently gets strep and he believes he may have caught it from her.  He has also been coughing with white sputum.  He has been having significant pain with swallowing.  Additionally, his throat pain has been interfering with sleep.  He has tried taking Tylenol and ibuprofen with minimal relief of symptoms.  He has also been feeling feverish.    REVIEW OF SYSTEMS  Review of Systems   Constitutional:  Positive for fever and malaise/fatigue.   HENT:  Positive for sore throat. Negative for ear pain.    Respiratory:  Positive for cough and sputum production.    Cardiovascular:  Negative for chest pain.   Musculoskeletal:  Negative for myalgias.     PAST MEDICAL HISTORY  Patient Active Problem List    Diagnosis Date Noted    Dyspnea on exertion 12/30/2021    Chest pain 12/30/2021    Costochondral chest pain 12/30/2021    Marijuana use 12/30/2021    Myalgia 06/04/2021    Cervicalgia 06/04/2021    Sleep disturbance 06/04/2021    Headache 04/07/2021    Visual disorder 04/07/2021    Elbow pain, chronic, unspecified laterality 11/07/2017    Grade I hemorrhoids 11/07/2017    Eczema 11/07/2017    Obesity (BMI 30-39.9) 11/07/2017    Bruxism 11/07/2017    Genital warts 07/16/2015    ADD (attention deficit disorder) 07/16/2015       SURGICAL HISTORY  patient denies any surgical history    ALLERGIES  Allergies   Allergen Reactions    Tramadol Shortness of Breath     Acid refulx       CURRENT MEDICATIONS  Home Medications       Reviewed by Kassidy Colon P.A.-C. (Physician Assistant) on 05/01/23 at 1441  Med List Status: <None>     Medication Last Dose Status   amphetamine-dextroamphetamine (ADDERALL XR) 15 MG XR capsule Not Taking Active  "  cyclobenzaprine (FLEXERIL) 10 mg Tab PRN Active                    SOCIAL HISTORY  Social History     Tobacco Use    Smoking status: Former     Packs/day: 0.50     Years: 12.00     Pack years: 6.00     Types: Cigarettes     Quit date: 2017     Years since quittin.2    Smokeless tobacco: Never   Vaping Use    Vaping Use: Some days    Substances: THC, slight    Devices: Disposable   Substance and Sexual Activity    Alcohol use: Yes     Comment: rare    Drug use: Yes     Types: Marijuana, Inhaled     Comment: marijuana-    Sexual activity: Yes     Partners: Female     Comment: no STD, two children       FAMILY HISTORY  Family History   Problem Relation Age of Onset    Psychiatric Illness Father         suicide    Cancer Brother         lung cancer, 43    Alcohol/Drug Brother         meth, heroine    Cancer Maternal Grandfather         lung cancer    Hypertension Mother     Diabetes Neg Hx     Heart Disease Neg Hx           Objective:     VITAL SIGNS: /76 (BP Location: Right arm, Patient Position: Sitting, BP Cuff Size: Adult)   Pulse 76   Temp 36.8 °C (98.3 °F) (Temporal)   Resp 16   Ht 1.854 m (6' 1\")   Wt 111 kg (245 lb 8 oz)   SpO2 96%   BMI 32.39 kg/m²     PHYSICAL EXAM  Physical Exam  Constitutional:       General: He is not in acute distress.     Appearance: Normal appearance. He is normal weight. He is not ill-appearing or toxic-appearing.   HENT:      Head: Normocephalic and atraumatic.      Nose: Nose normal.      Mouth/Throat:      Mouth: Mucous membranes are moist.      Pharynx: Oropharyngeal exudate and posterior oropharyngeal erythema present.      Comments: Tonsils 3+  Eyes:      Conjunctiva/sclera: Conjunctivae normal.   Cardiovascular:      Rate and Rhythm: Normal rate and regular rhythm.      Heart sounds: Normal heart sounds.   Pulmonary:      Effort: Pulmonary effort is normal. No respiratory distress.      Breath sounds: Normal breath sounds. No wheezing, rhonchi or rales. "   Lymphadenopathy:      Cervical: Cervical adenopathy present.   Skin:     General: Skin is warm and dry.   Neurological:      General: No focal deficit present.      Mental Status: He is alert and oriented to person, place, and time.   Psychiatric:         Mood and Affect: Mood normal.       Assessment/Plan:     1. Sore throat  - POCT CEPHEID GROUP A STREP - PCR  - lidocaine (XYLOCAINE) 2 % Solution; Take 15 mL by mouth as needed for Throat/Mouth Pain (Swish and split solution as needed for throat pain.).  Dispense: 100 mL; Refill: 0  - diclofenac DR (VOLTAREN) 75 MG Tablet Delayed Response; Take 1 Tablet by mouth 2 times a day for 10 days.  Dispense: 20 Tablet; Refill: 0    2. Cough, unspecified type    Results:  Strep negative    MDM/Comments:  Patient has stable vital signs and is non-toxic appearing. Discussed supportive care with hydration, rest, Tylenol/Ibuprofen as needed.  Patient reports that he has a good response to diclofenac tablets in comparison to Tylenol or ibuprofen.  Lidocaine mouthwash given for sore throat.  Politely declined cough medication.  Strep testing done in office with negative results.  Patient demonstrated understanding of treatment plan and will return to clinic if symptoms fail to improve or worsen in any way.    Differential diagnosis, natural history, supportive care, and indications for immediate follow-up discussed. All questions answered. Patient agrees with the plan of care.    Follow-up as needed if symptoms worsen or fail to improve to PCP, Urgent care or Emergency Room.    I have personally reviewed prior external notes and test results pertinent to today's visit.  I have independently reviewed and interpreted all diagnostics ordered during this urgent care acute visit.   Discussed management options (risks,benefits, and alternatives to treatment). Pt expresses understanding and the treatment plan was agreed upon. Questions were encouraged and answered to pt's  satisfaction.    Please note that this dictation was created using voice recognition software. I have made a reasonable attempt to correct obvious errors, but I expect that there are errors of grammar and possibly content that I did not discover before finalizing the note.

## 2023-05-04 ENCOUNTER — OFFICE VISIT (OUTPATIENT)
Dept: MEDICAL GROUP | Facility: PHYSICIAN GROUP | Age: 33
End: 2023-05-04
Payer: COMMERCIAL

## 2023-05-04 ENCOUNTER — APPOINTMENT (OUTPATIENT)
Dept: URGENT CARE | Facility: PHYSICIAN GROUP | Age: 33
End: 2023-05-04
Payer: COMMERCIAL

## 2023-05-04 DIAGNOSIS — H66.012 NON-RECURRENT ACUTE SUPPURATIVE OTITIS MEDIA OF LEFT EAR WITH SPONTANEOUS RUPTURE OF TYMPANIC MEMBRANE: ICD-10-CM

## 2023-05-04 DIAGNOSIS — A63.0 GENITAL WARTS: ICD-10-CM

## 2023-05-04 PROCEDURE — 99213 OFFICE O/P EST LOW 20 MIN: CPT | Performed by: FAMILY MEDICINE

## 2023-05-04 RX ORDER — AMOXICILLIN AND CLAVULANATE POTASSIUM 875; 125 MG/1; MG/1
1 TABLET, FILM COATED ORAL 2 TIMES DAILY
Qty: 14 TABLET | Refills: 0 | Status: SHIPPED | OUTPATIENT
Start: 2023-05-04 | End: 2023-05-11

## 2023-05-04 RX ORDER — KETOTIFEN FUMARATE 0.25 MG/ML
1 SOLUTION/ DROPS OPHTHALMIC 2 TIMES DAILY
Qty: 10 ML | Refills: 0 | Status: SHIPPED | OUTPATIENT
Start: 2023-05-04

## 2023-05-04 RX ORDER — PODOFILOX 5 MG/ML
1 SOLUTION TOPICAL 2 TIMES DAILY
Qty: 3.5 ML | Refills: 0 | Status: SHIPPED | OUTPATIENT
Start: 2023-05-04

## 2023-05-04 NOTE — PROGRESS NOTES
CHIEF COMPLAINT / REASON FOR VISIT  Miky Khanna is a 32 y.o. male that presents today for ***    HISTORY OF PRESENT ILLNESS  ***    OBJECTIVE     There were no vitals taken for this visit. There is no height or weight on file to calculate BMI.    PHYSICAL EXAM  Constitutional: Sitting comfortably, in no acute distress, responds to questions appropriately.  Eyes:  No conjunctival injection, no scleral icterus, PERRL  Ears: Normal tympanic membranes, external ear canals clear  Neck: No cervical lymphadenopathy. No JVD  Mouth: oral mucosa moist  Throat: Oropharynx clear  Heart: Regular S1 S2, no murmurs, rub, or gallops  Lungs: Clear to auscultation bilaterally, no wheezes, rales, or rhonchi  Abdomen: Soft, nontender  Extremities: No lower extremity edema  Skin: Warm and dry, no rashes or lesions on face or exposed upper extremities    ASSESSMENT & PLAN  There are no diagnoses linked to this encounter.     No follow-ups on file.

## 2023-05-04 NOTE — PROGRESS NOTES
CHIEF COMPLAINT / REASON FOR VISIT  Miky Khanna is a 32 y.o. male that presents today for left ear pain    HISTORY OF PRESENT ILLNESS  Sat night, sore throat, strep test negative.  Has been taking daquil and ibuprofen due to painful sore throat.   Now left ear feels blocked up, pain and pressure, left ear drainage and ringin  Nasal drainage  Right conjunctivitis    He is also wondering about treatment for genital warts    OBJECTIVE   PHYSICAL EXAM  Constitutional: Sitting comfortably, in no acute distress, responds to questions appropriately.  Eyes: right conjunctival injection   Ears: Left TM erythematous and bulging with perforation  Heart: Regular S1 S2, no murmurs, rub, or gallops  Lungs: Clear to auscultation bilaterally, no wheezes, rales, or rhonchi  Skin: Warm and dry, no rashes or lesions on face or exposed upper extremities    ASSESSMENT & PLAN  1. Non-recurrent acute suppurative otitis media of left ear with spontaneous rupture of tympanic membrane  Viral upper respiratory tract infection, complicated by left otitis media with TM rupture.  Provided 7-day course of Augmentin for treatment of left acute otitis media.  Return precautions provided.    2. Genital warts  Offered cryotherapy at future visit if he desires.  - podofilox (CONDYLOX) 0.5 % external solution; Apply 1 Application. topically 2 times a day.  Dispense: 3.5 mL; Refill: 0

## 2023-05-23 ENCOUNTER — OFFICE VISIT (OUTPATIENT)
Dept: MEDICAL GROUP | Facility: MEDICAL CENTER | Age: 33
End: 2023-05-23
Payer: COMMERCIAL

## 2023-05-23 VITALS
HEIGHT: 73 IN | SYSTOLIC BLOOD PRESSURE: 118 MMHG | TEMPERATURE: 98.4 F | DIASTOLIC BLOOD PRESSURE: 76 MMHG | WEIGHT: 247 LBS | HEART RATE: 94 BPM | BODY MASS INDEX: 32.74 KG/M2 | OXYGEN SATURATION: 95 %

## 2023-05-23 DIAGNOSIS — J02.9 PHARYNGITIS, UNSPECIFIED ETIOLOGY: ICD-10-CM

## 2023-05-23 PROCEDURE — 3078F DIAST BP <80 MM HG: CPT | Performed by: FAMILY MEDICINE

## 2023-05-23 PROCEDURE — 3074F SYST BP LT 130 MM HG: CPT | Performed by: FAMILY MEDICINE

## 2023-05-23 PROCEDURE — 99213 OFFICE O/P EST LOW 20 MIN: CPT | Performed by: FAMILY MEDICINE

## 2023-05-23 RX ORDER — CEFDINIR 300 MG/1
300 CAPSULE ORAL 2 TIMES DAILY
Qty: 20 CAPSULE | Refills: 0 | Status: SHIPPED | OUTPATIENT
Start: 2023-05-23 | End: 2023-06-02

## 2023-05-23 ASSESSMENT — PATIENT HEALTH QUESTIONNAIRE - PHQ9: CLINICAL INTERPRETATION OF PHQ2 SCORE: 0

## 2023-05-23 NOTE — PROGRESS NOTES
"Subjective:     CC: \"sore throat\"    HPI:   Miky presents today with his daughter:    Onset 3 weeks ago  Started with awful sore throat, felt like razor blades, difficulty swallowing  Used OTC medicine  Developed cough and sinus congestion, dark green mucus  Pressure built up and had ear drum perforation 5/4  He also had pineye  Received 7 days of Augmentin  Feeling a little better but had low level symptoms  Traveled to [place] and had fun  Symptoms have now worsened  Has return of sore throat, cough, body aches  Daughter is also having symptoms, will be seeing pediatrician this afternoon  Has had at least 1 negative strep test previously  He wonders about sternocleidomastoid syndrome that he read about on [email or web address].[facility]      No problems updated.    Current Outpatient Medications Ordered in Epic   Medication Sig Dispense Refill    cefdinir (OMNICEF) 300 MG Cap Take 1 Capsule by mouth 2 times a day for 10 days. 20 Capsule 0    ketotifen (ZADITOR) 0.025 % ophthalmic solution Administer 1 Drop into the right eye 2 times a day. 10 mL 0    podofilox (CONDYLOX) 0.5 % external solution Apply 1 Application. topically 2 times a day. 3.5 mL 0    lidocaine (XYLOCAINE) 2 % Solution Take 15 mL by mouth as needed for Throat/Mouth Pain (Swish and split solution as needed for throat pain.). 100 mL 0    cyclobenzaprine (FLEXERIL) 10 mg Tab Take 1 Tablet by mouth 3 times a day as needed for Muscle Spasms. 30 Tablet 0    amphetamine-dextroamphetamine (ADDERALL XR) 15 MG XR capsule Take 15 mg by mouth every day. (Patient not taking: Reported on 1/4/2023)       No current [facility]-ordered facility-administered medications on file.       Health Maintenance: acute visit    ROS:  ROS see HPI    Objective:     Exam:  /76   Pulse 94   Temp 36.9 °C (98.4 °F) (Temporal)   Ht 1.854 m (6' 1\")   Wt 112 kg (247 lb)   SpO2 95%   BMI 32.59 kg/m²  Body mass index is 32.59 kg/m².    Physical Exam  Vitals reviewed.   Constitutional:       " General: He is not in acute distress.     Appearance: Normal appearance.   HENT:      Head: Normocephalic and atraumatic.      Right Ear: Tympanic membrane normal.      Ears:      Comments: Left TM is healing but no longer open     Mouth/Throat:      Pharynx: Oropharyngeal exudate and posterior oropharyngeal erythema present.   Cardiovascular:      Rate and Rhythm: Normal rate and regular rhythm.      Heart sounds: Normal heart sounds.   Pulmonary:      Effort: Pulmonary effort is normal.      Breath sounds: Normal breath sounds.   Musculoskeletal:      Cervical back: No tenderness.   Lymphadenopathy:      Cervical: Cervical adenopathy present.   Skin:     General: Skin is warm and dry.   Neurological:      Mental Status: He is alert. Mental status is at baseline.   Psychiatric:         Mood and Affect: Mood normal.         Behavior: Behavior normal.           Assessment & Plan:     32 y.o. male with the following -     1. Pharyngitis, unspecified etiology  Either continued infection, reinfection or coinfection with many potential sick contacts  Had improved some with Augmentin nearly 2 weeks ago, will switch to cefdinir. I am not familiar with mentioned syndrome, will try treating acute infection.  - cefdinir (OMNICEF) 300 MG Cap; Take 1 Capsule by mouth 2 times a day for 10 days.  Dispense: 20 Capsule; Refill: 0    My recommendations for an upper respiratory infection:  - Use a humidifier, especially at night. Cold or warm water humidifiers have the same effect.  - Je Med squeeze bottle sinus rinses twice a day.  - Hot tea + honey + fresh lemon juice  - Throat Coat herbal tea  - Honey by itself has been shown to help fight bugs and provide cough relief  - Chicken noodle soup has also been shown to help fight bugs  - Drink plenty of water  - Vitamin C supplementation   - Tylenol (no more than 3,000 mg in a day) as needed  - Ibuprofen (no more than 2,400 mg in a day) as needed       Return if symptoms worsen or  fail to improve.    Please note that this dictation was created using voice recognition software. I have made every reasonable attempt to correct obvious errors, but I expect that there are errors of grammar and possibly content that I did not discover before finalizing the note.

## 2024-01-18 ENCOUNTER — OFFICE VISIT (OUTPATIENT)
Dept: URGENT CARE | Facility: CLINIC | Age: 34
End: 2024-01-18
Payer: MEDICAID

## 2024-01-18 VITALS
WEIGHT: 265.9 LBS | HEIGHT: 73 IN | BODY MASS INDEX: 35.24 KG/M2 | SYSTOLIC BLOOD PRESSURE: 116 MMHG | RESPIRATION RATE: 18 BRPM | TEMPERATURE: 97.9 F | HEART RATE: 94 BPM | DIASTOLIC BLOOD PRESSURE: 78 MMHG | OXYGEN SATURATION: 93 %

## 2024-01-18 DIAGNOSIS — J02.9 PHARYNGITIS, UNSPECIFIED ETIOLOGY: ICD-10-CM

## 2024-01-18 DIAGNOSIS — M25.512 CHRONIC PAIN OF BOTH SHOULDERS: ICD-10-CM

## 2024-01-18 DIAGNOSIS — M25.511 CHRONIC PAIN OF BOTH SHOULDERS: ICD-10-CM

## 2024-01-18 DIAGNOSIS — G89.29 CHRONIC PAIN OF BOTH SHOULDERS: ICD-10-CM

## 2024-01-18 LAB — S PYO DNA SPEC NAA+PROBE: NOT DETECTED

## 2024-01-18 PROCEDURE — 99213 OFFICE O/P EST LOW 20 MIN: CPT | Mod: 25

## 2024-01-18 PROCEDURE — 87651 STREP A DNA AMP PROBE: CPT

## 2024-01-18 PROCEDURE — 3078F DIAST BP <80 MM HG: CPT

## 2024-01-18 PROCEDURE — 3074F SYST BP LT 130 MM HG: CPT

## 2024-01-18 RX ORDER — CYCLOBENZAPRINE HCL 10 MG
10 TABLET ORAL 3 TIMES DAILY PRN
Qty: 30 TABLET | Refills: 0 | Status: SHIPPED | OUTPATIENT
Start: 2024-01-18

## 2024-01-18 ASSESSMENT — ENCOUNTER SYMPTOMS
HEMOPTYSIS: 0
HEADACHES: 0
WEIGHT LOSS: 0
CHILLS: 0
SWEATS: 0
HEARTBURN: 0
SORE THROAT: 0
COUGH: 1
MYALGIAS: 0
FEVER: 0
SHORTNESS OF BREATH: 0
WHEEZING: 0
RHINORRHEA: 0

## 2024-01-18 NOTE — PROGRESS NOTES
"Subjective:     Miky Khanna is a pleasant 33 y.o. male who presents for   Chief Complaint   Patient presents with    Cough     X 2 days Sore throat, would like a refill of flexeril       The patient is accompanied by {Accompanying Child:[id number]} who is the historian.    Cough        Review of Systems   Respiratory:  Positive for cough.        PMH:   Past Medical History:   Diagnosis Date    ADHD     Anxiety     Chickenpox     Cough     Painful breathing     Shortness of breath     Sputum production     Substance abuse (HCC)     Wheezing      ALLERGIES:   Allergies   Allergen Reactions    Tramadol Shortness of Breath     Acid refulx     SURGHX: No past surgical history on file.  SOCHX:   Social History     Socioeconomic History    Marital status: Single   Tobacco Use    Smoking status: Former     Current packs/day: 0.00     Average packs/day: 0.5 packs/day for 12.0 years (6.0 ttl pk-yrs)     Types: Cigarettes     Start date: 2005     Quit date: 2017     Years since quittin.9    Smokeless tobacco: Never   Vaping Use    Vaping Use: Some days    Substances: THC, slight    Devices: Disposable   Substance and Sexual Activity    Alcohol use: Yes     Comment: rare    Drug use: Yes     Types: Marijuana, Inhaled     Comment: marijuana-    Sexual activity: Yes     Partners: Female     Comment: no STD, two children     FH:   Family History   Problem Relation Age of Onset    Psychiatric Illness Father         suicide    Cancer Brother         lung cancer, 43    Alcohol/Drug Brother         meth, heroine    Cancer Maternal Grandfather         lung cancer    Hypertension Mother     Diabetes Neg Hx     Heart Disease Neg Hx          Objective:   /78 (BP Location: Left arm, Patient Position: Sitting, BP Cuff Size: Adult long)   Pulse 94   Temp 36.6 °C (97.9 °F)   Resp 18   Ht 1.854 m (6' 1\")   Wt 121 kg (265 lb 14.4 oz)   SpO2 93%   BMI 35.08 kg/m²     Physical Exam    MDM:   Assessment      1. " Pharyngitis, unspecified etiology  - POCT CEPHEID GROUP A STREP - PCR      All questions answered. {Accompanying Child caps:[id number]} verbalized understanding and is in agreement with this plan of care.     If symptoms are worsening or not improving in 3-5 days, follow-up with PCP or return to UC. Differential diagnosis, natural history, and supportive care discussed. AVS handout given and reviewed with {Accompanying Child:[id number]}.  {Accompanying Child caps:[id number]} educated on red flags and when to seek treatment back in ED or UC.     I personally reviewed prior external notes and test results pertinent to today's visit.  I have independently reviewed and interpreted all diagnostics ordered during this urgent care visit.     This dictation has been created using voice recognition software. The accuracy of the dictation is limited by the abilities of the software. I expect there may be some errors of grammar and possibly content. I made every attempt to manually correct the errors within my dictation. However, errors related to voice recognition software may still exist and should be interpreted within the appropriate context.    This note was electronically signed by MCKAY Ortiz

## 2024-01-18 NOTE — PROGRESS NOTES
Subjective:   Miky Khanna is a very pleasant 33 y.o. male who presents for:    Chief Complaint   Patient presents with    Cough     X 2 days Sore throat, would like a refill of flexeril       HPI:    The patient presents to the urgent care with 2 separate complaints:    Cough  This is a new problem. Episode onset: two days ago, he developed a sore throat, cough, and congestion. The cough is Productive of sputum. Associated symptoms include nasal congestion and postnasal drip. Pertinent negatives include no chest pain, chills, ear congestion, ear pain, fever, headaches, heartburn, hemoptysis, myalgias, rash, rhinorrhea, sore throat, shortness of breath, sweats, weight loss or wheezing. Treatments tried: DayQuil. The treatment provided mild relief.     His daughter is ill with similar symptoms.    Right forearm and bilateral shoulder pain:    The patient has a PMH of recurrent tendinitis in the right arm due to playing video games.  Additionally, he reports a personal history of clenching his teeth in his sleep.  He has taken Flexeril in the past for these conditions with positive results.  Over the past three months, he has been experiencing bilateral shoulder pain that he feels is due to his occupation as a .  He engages in repetitive motions on a daily basis, which tend to exacerbate his pain.  At the time of physical examination, he is having acute pain in the shoulders as he recently went snowboarding, with the left being more sore than the right. He noticed that he has been having a difficult time pushing himself up from his sitting to standing position without shoulder discomfort.  Today, the shoulder pain is worse on the LS. The pain is exacerbated with movement and relieved with rest.  He has been using Flexeril and over-the-counter anti-inflammatories to help the pain, which she has found mildly helpful.    ROS:    Review of Systems   Constitutional:  Negative for chills, fever and weight loss.    HENT:  Positive for postnasal drip. Negative for ear pain, rhinorrhea and sore throat.    Respiratory:  Positive for cough. Negative for hemoptysis, shortness of breath and wheezing.    Cardiovascular:  Negative for chest pain.   Gastrointestinal:  Negative for heartburn.   Musculoskeletal:  Negative for myalgias.   Skin:  Negative for rash.   Neurological:  Negative for headaches.       Medications:      Current Outpatient Medications   Medication Sig    ketotifen (ZADITOR) 0.025 % ophthalmic solution Administer 1 Drop into the right eye 2 times a day. (Patient not taking: Reported on 1/18/2024)    podofilox (CONDYLOX) 0.5 % external solution Apply 1 Application. topically 2 times a day. (Patient not taking: Reported on 1/18/2024)    lidocaine (XYLOCAINE) 2 % Solution Take 15 mL by mouth as needed for Throat/Mouth Pain (Swish and split solution as needed for throat pain.). (Patient not taking: Reported on 1/18/2024)    cyclobenzaprine (FLEXERIL) 10 mg Tab Take 1 Tablet by mouth 3 times a day as needed for Muscle Spasms. (Patient not taking: Reported on 1/18/2024)    amphetamine-dextroamphetamine (ADDERALL XR) 15 MG XR capsule Take 15 mg by mouth every day. (Patient not taking: Reported on 1/4/2023)       Allergies:     Allergies   Allergen Reactions    Tramadol Shortness of Breath     Acid refulx       Problem List:     Patient Active Problem List   Diagnosis    Genital warts    ADD (attention deficit disorder)    Elbow pain, chronic, unspecified laterality    Grade I hemorrhoids    Eczema    Obesity (BMI 30-39.9)    Bruxism    Headache    Visual disorder    Myalgia    Cervicalgia    Sleep disturbance    Dyspnea on exertion    Chest pain    Costochondral chest pain    Marijuana use       Surgical History:    No past surgical history on file.    Past Social Hx:     Social History     Socioeconomic History    Marital status: Single   Tobacco Use    Smoking status: Former     Current packs/day: 0.00     Average  packs/day: 0.5 packs/day for 12.0 years (6.0 ttl pk-yrs)     Types: Cigarettes     Start date: 2005     Quit date: 2017     Years since quittin.9    Smokeless tobacco: Never   Vaping Use    Vaping Use: Some days    Substances: THC, slight    Devices: Disposable   Substance and Sexual Activity    Alcohol use: Yes     Comment: rare    Drug use: Yes     Types: Marijuana, Inhaled     Comment: marijuana-    Sexual activity: Yes     Partners: Female     Comment: no STD, two children        Past Family Hx:      Family History   Problem Relation Age of Onset    Psychiatric Illness Father         suicide    Cancer Brother         lung cancer, 43    Alcohol/Drug Brother         meth, heroine    Cancer Maternal Grandfather         lung cancer    Hypertension Mother     Diabetes Neg Hx     Heart Disease Neg Hx        Problem list, medications, and allergies reviewed by myself today in Epic.     Objective:     Vitals:    24 1252   BP: 116/78   Pulse: 94   Resp: 18   Temp: 36.6 °C (97.9 °F)   SpO2: 93%       Physical Exam  Vitals reviewed.   Constitutional:       General: He is not in acute distress.     Appearance: Normal appearance. He is not ill-appearing, toxic-appearing or diaphoretic.   HENT:      Head: Normocephalic and atraumatic.      Right Ear: Tympanic membrane, ear canal and external ear normal.      Left Ear: Tympanic membrane, ear canal and external ear normal.      Nose: Congestion present.      Mouth/Throat:      Mouth: Mucous membranes are moist.      Pharynx: Oropharynx is clear.   Eyes:      Extraocular Movements: Extraocular movements intact.      Conjunctiva/sclera: Conjunctivae normal.      Pupils: Pupils are equal, round, and reactive to light.   Cardiovascular:      Rate and Rhythm: Normal rate and regular rhythm.      Pulses: Normal pulses.      Heart sounds: Normal heart sounds.   Pulmonary:      Effort: Pulmonary effort is normal. No respiratory distress.      Breath sounds: Normal  breath sounds. No stridor. No wheezing, rhonchi or rales.   Chest:      Chest wall: No tenderness.   Abdominal:      General: Abdomen is flat.      Palpations: Abdomen is soft.   Musculoskeletal:         General: Normal range of motion.        Arms:       Cervical back: Normal range of motion and neck supple. No rigidity or tenderness.      Comments: TTP over the bilateral anterior aspects of the deltoid.  No AC tenderness.  Full range of motion without discomfort.   General: no gross deformity  ROM: flex 180, extension 50, ER 60, IR equal  Strength: 5/5 abduction, 5/5 ER, 5/5 IR, 5/5 subscapular lift  Rotator Cuff: Empty can -  AC Joint: Cross body -  Neuro: Sensation equal and intact bilaterally  Pulses: radial, ulnar 2+    Lymphadenopathy:      Cervical: No cervical adenopathy.   Skin:     General: Skin is warm and dry.   Neurological:      General: No focal deficit present.      Mental Status: He is alert and oriented to person, place, and time. Mental status is at baseline.   Psychiatric:         Mood and Affect: Mood normal.         Behavior: Behavior normal.         Thought Content: Thought content normal.         Judgment: Judgment normal.             Results from POCT:   Results for orders placed or performed in visit on 01/18/24   POCT CEPHEID GROUP A STREP - PCR   Result Value Ref Range    POC Group A Strep, PCR Not Detected Not Detected, Invalid       Assessment/Plan:     Diagnosis and associated orders:     1. Pharyngitis, unspecified etiology  - POCT CEPHEID GROUP A STREP - PCR         Comments/MDM:     Shoulder exam testing negative for any concerning symptoms that would warrant imaging at this time.  Due to patient's worsening symptoms, referral placed to sports medicine for evaluation and treatment.  Encouraged supportive measures, including home physical therapy, Tylenol/ibuprofen as needed for discomfort, warm/cool compresses, frequent breaks at work to avoid overuse/irritating his injury  Refill  of Flexeril sent to patient's preferred pharmacy.  Educated on patient when to use this medication. Reviewed the patient's NARRevettoHECK Query. Advised the patient to only take this medication at night, as it may cause drowsiness. Instructed the patient not to take the medication while at work, driving, or operating machinery.  Instructed the patient not to drink alcohol while taking this medication.     POCT strep NEGATIVE      We discussed a throat culture but due to short duration of symptoms and the fact it is unlikely to  we will hold off for now.  If the patient continues to have symptoms I advised to return.  The natural history of mono would suggest that the patient has not developed antibodies and so I will not do a mono spot at this time.  If the patient continues to have pharyngitis and constitutional symptoms I instructed them to return 2 weeks for Monospot.    Supportive care discussed:  -Maintain hydration/water intake  -Nasal saline rinses 2-3 times a day   -May use short term nasal sprays, such as oxymetazoline (Afrin) to help relieve nasal discomfort, congestion, and/or pressure. Decongestant sprays should not be used longer than three consecutive days.   -Nasal rinsing with saline nasal spray or salt water (e.g., neti pot) can help relieve nasal dryness.  -Breathe Right nasal strips at night for nasal congestion  -Ponaris nasal emmollient for nasal congestion, dryness, and inflammation (do not use with iodine sensitivity)  -Cool mist humidification, chest rubs, warm tea with honey, increased fluid intake to thin secretions  -May gargle with salt water up to 4x/day as needed for throat discomfort (1 tsp salt dissolved in 1 cup warm water)  -Over-the-counter throat sprays, rest, hydration with frozen (eg, ice or popsicles) or warmed liquids, herbal tea containing licorice root, elm inner bark, marshmallow root, and licorice root aqueous dry extract, Cepacol lozenges, soft diet, honey,  vitamin C, zinc lozenges, and elderberry supplements.  I considered other causes of pharyngitis including Group C, G strep, peritonsillar abscess, yovanny's angina, and retropharyngeal abscess but the patient's reported symptoms and my exam do not support these alternative diagnosis based on information I have available today.  This may change and I encouraged the patient to return to clinic if they are experiencing new symptoms or their symptoms fail to resolve with time as we cannot rule out all pathology from a single Urgent Care visit.   Return to clinic or go to the ED if symptoms worsen or fail to improve, or if patient should develop worsening/increasing sore throat, difficulty swallowing, drooling, change in voice, swollen glands, shortness of breath, ear pain, cough, congestion, fever/chills, and/or any concerning symptoms.   Time I spent evaluating the patient in urgent care today was 33 minutes. This time includes preparing for visit, reviewing any pertinent notes or test results, counseling/education, exam, obtaining HPI, interpretation of lab tests, medication management and documentation as indicated above. Time does not include separately billable procedures noted.            All questions answered. Patient verbalized understanding and is in agreement with this plan of care.     If symptoms are worsening or not improving in 3-5 days, follow-up with PCP or return to UC. Differential diagnosis, natural history, and supportive care discussed. AVS handout given and reviewed with patient. Patient educated on red flags and when to seek treatment back in ED or UC.     I personally reviewed prior external notes and test results pertinent to today's visit.  I have independently reviewed and interpreted all diagnostics ordered during this urgent care visit.     This dictation has been created using voice recognition software. The accuracy of the dictation is limited by the abilities of the software. I expect  there may be some errors of grammar and possibly content. I made every attempt to manually correct the errors within my dictation. However, errors related to voice recognition software may still exist and should be interpreted within the appropriate context.    This note was electronically signed by MCKAY Ortiz

## 2024-05-30 ENCOUNTER — OFFICE VISIT (OUTPATIENT)
Dept: URGENT CARE | Facility: CLINIC | Age: 34
End: 2024-05-30
Payer: MEDICAID

## 2024-05-30 VITALS
RESPIRATION RATE: 16 BRPM | SYSTOLIC BLOOD PRESSURE: 104 MMHG | OXYGEN SATURATION: 98 % | HEIGHT: 73 IN | TEMPERATURE: 97.9 F | WEIGHT: 270 LBS | BODY MASS INDEX: 35.78 KG/M2 | DIASTOLIC BLOOD PRESSURE: 62 MMHG | HEART RATE: 87 BPM

## 2024-05-30 DIAGNOSIS — R20.0 NUMBNESS OF LEFT FOOT: ICD-10-CM

## 2024-05-30 DIAGNOSIS — M54.12 CERVICAL RADICULOPATHY: ICD-10-CM

## 2024-05-30 RX ORDER — KETOROLAC TROMETHAMINE 30 MG/ML
15 INJECTION, SOLUTION INTRAMUSCULAR; INTRAVENOUS ONCE
Status: SHIPPED | OUTPATIENT
Start: 2024-05-30 | End: 2024-06-02

## 2024-05-30 RX ORDER — METHOCARBAMOL 500 MG/1
500 TABLET, FILM COATED ORAL 3 TIMES DAILY
Qty: 30 TABLET | Refills: 0 | Status: SHIPPED | OUTPATIENT
Start: 2024-05-30

## 2024-05-30 RX ORDER — METHYLPREDNISOLONE 4 MG/1
TABLET ORAL
Qty: 21 TABLET | Refills: 0 | Status: SHIPPED | OUTPATIENT
Start: 2024-05-30

## 2024-05-30 ASSESSMENT — ENCOUNTER SYMPTOMS
NECK PAIN: 1
SENSORY CHANGE: 0
FALLS: 0
MYALGIAS: 0
CARDIOVASCULAR NEGATIVE: 1
CONSTITUTIONAL NEGATIVE: 1
TREMORS: 0
RESPIRATORY NEGATIVE: 1
DIZZINESS: 0
HEADACHES: 0
TINGLING: 1
BACK PAIN: 0

## 2024-05-30 NOTE — PROGRESS NOTES
Subjective     Miky Khanna is a very pleasant 33 y.o. male who presents with Numbness (Foot and hands )            HPI  Ongoing right-sided cervical radiculopathy.  Symptoms have been worsening and they are now affecting his ADLs.  He went to physical therapy for a while for his right lateral epicondylitis.  However there was limited success as it was thought to be a dysfunction higher up the nerve root.  Pain is on the right lateral cervical muscles with tension and spasming.  Worse with overhead motion and lifting.  Patient is a  and notes that it has started to affect his vocation.  He has some weakness.  No headache, dizziness, chest pain or shortness of breath.    Patient also has slight numbness on the dorsum of his left foot at the first and second metatarsal base.  No swelling, redness or tenderness.  No injury fall or precipitating event.  He does note symptoms started after he was kneeling at work for prolonged duration.  No other pertinent past medical history      PMH:  has a past medical history of ADHD, Anxiety, Chickenpox, Cough, Painful breathing, Shortness of breath, Sputum production, Substance abuse (HCC), and Wheezing.    He has no past medical history of [place] measles.  MEDS:   Current Outpatient Medications:     methylPREDNISolone (MEDROL DOSEPAK) 4 MG Tablet Therapy Pack, Follow schedule on package instructions., Disp: 21 Tablet, Rfl: 0    methocarbamol (ROBAXIN) 500 MG Tab, Take 1 Tablet by mouth 3 times a day., Disp: 30 Tablet, Rfl: 0    Current Facility-Administered Medications:     ketorolac (Toradol) injection 15 mg, 15 mg, Intramuscular, Once,   ALLERGIES:   Allergies   Allergen Reactions    Tramadol Shortness of Breath     Acid refulx     SURGHX: History reviewed. No pertinent surgical history.  SOCHX:  reports that he quit smoking about 7 years ago. His smoking use included cigarettes. He started smoking about 19 years ago. He has a 6 pack-year smoking history. He has  "never used smokeless tobacco. He reports current alcohol use. He reports current drug use. Drugs: Marijuana and Inhaled.  FH: family history includes Alcohol/Drug in his brother; Cancer in his brother and maternal grandfather; Hypertension in his mother; Psychiatric Illness in his father.          Review of Systems   Constitutional: Negative.    Respiratory: Negative.     Cardiovascular: Negative.    Musculoskeletal:  Positive for joint pain and neck pain. Negative for back pain, falls and myalgias.   Neurological:  Positive for tingling. Negative for dizziness, tremors, sensory change and headaches.       Medications, Allergies, and current problem list reviewed today in Epic           Objective     /62   Pulse 87   Temp 36.6 °C (97.9 °F) (Temporal)   Resp 16   Ht 1.854 m (6' 1\")   Wt 122 kg (270 lb)   SpO2 98%   BMI 35.62 kg/m²      Physical Exam  Vitals and nursing note reviewed.   Constitutional:       General: He is not in acute distress.     Appearance: Normal appearance. He is well-developed. He is not diaphoretic.   HENT:      Head: Normocephalic.      Right Ear: Hearing and external ear normal.      Left Ear: Hearing and external ear normal.      Nose: Nose normal.      Mouth/Throat:      Mouth: Mucous membranes are moist.   Eyes:      General:         Right eye: No discharge.         Left eye: No discharge.      Conjunctiva/sclera: Conjunctivae normal.   Neck:        Comments: Right-sided cervical paraspinal muscular TTP edema and spasming.  Pain does radiate through the shoulder through the lateral epicondyles and up to the ulnar nerve distribution.  No bony tenderness.  Pain with overhead motion and cervical motion.  Cardiovascular:      Rate and Rhythm: Normal rate and regular rhythm.   Pulmonary:      Effort: Pulmonary effort is normal. No respiratory distress.      Breath sounds: Normal breath sounds.   Musculoskeletal:      Cervical back: Neck supple. Edema and signs of trauma present. " No erythema, rigidity, torticollis or crepitus. Pain with movement and muscular tenderness present. No spinous process tenderness. Decreased range of motion.      Comments: Subjective numbness of the dorsum of the left foot.  No deformity erythema swelling ecchymosis bony tenderness.  Full range of motion.   Skin:     General: Skin is warm and dry.   Neurological:      General: No focal deficit present.      Mental Status: He is alert and oriented to person, place, and time. Mental status is at baseline.   Psychiatric:         Mood and Affect: Mood normal.         Behavior: Behavior normal.         Thought Content: Thought content normal.         Judgment: Judgment normal.                             Assessment & Plan         1. Cervical radiculopathy  methylPREDNISolone (MEDROL DOSEPAK) 4 MG Tablet Therapy Pack    methocarbamol (ROBAXIN) 500 MG Tab    Referral to Orthopedics    ketorolac (Toradol) injection 15 mg      2. Numbness of left foot          Referral to specialty for ongoing worsening cervical radiculopathy.  Symptoms are starting to affect his ADLs.  No red flag symptoms and vital signs are appropriate.  Symptomatic treatment provided in clinic. OTC meds and conservative measures as discussed      I personally reviewed prior external notes and test results pertinent to today's visit. Return to clinic or go to ED if symptoms worsen or persist. Red flag symptoms and indications for ED discussed at length. Patient/Parent/Guardian voices understanding.  AVS with post-visit instructions printed and provided or given verbally.  Follow-up with your primary care provider in 3-5 days. All side effects and potential interactions of prescribed medication discussed including allergic response, GI upset, tendon injury, rash, sedation, OCP effectiveness, etc.    Please note that this dictation was created using voice recognition software. I have made every reasonable attempt to correct obvious errors, but I expect  that there are errors of grammar and possibly content that I did not discover before finalizing the note.

## 2024-08-23 ENCOUNTER — OFFICE VISIT (OUTPATIENT)
Dept: URGENT CARE | Facility: CLINIC | Age: 34
End: 2024-08-23
Payer: MEDICAID

## 2024-08-23 ENCOUNTER — HOSPITAL ENCOUNTER (INPATIENT)
Facility: MEDICAL CENTER | Age: 34
LOS: 3 days | DRG: 392 | End: 2024-08-26
Attending: EMERGENCY MEDICINE | Admitting: HOSPITALIST
Payer: MEDICAID

## 2024-08-23 ENCOUNTER — APPOINTMENT (OUTPATIENT)
Dept: URGENT CARE | Facility: PHYSICIAN GROUP | Age: 34
End: 2024-08-23
Payer: MEDICAID

## 2024-08-23 ENCOUNTER — APPOINTMENT (OUTPATIENT)
Dept: RADIOLOGY | Facility: MEDICAL CENTER | Age: 34
DRG: 392 | End: 2024-08-23
Attending: EMERGENCY MEDICINE
Payer: MEDICAID

## 2024-08-23 VITALS
DIASTOLIC BLOOD PRESSURE: 66 MMHG | TEMPERATURE: 97.7 F | RESPIRATION RATE: 17 BRPM | HEART RATE: 102 BPM | WEIGHT: 266.3 LBS | BODY MASS INDEX: 35.29 KG/M2 | SYSTOLIC BLOOD PRESSURE: 104 MMHG | OXYGEN SATURATION: 97 % | HEIGHT: 73 IN

## 2024-08-23 DIAGNOSIS — K57.80 PERFORATED DIVERTICULUM: ICD-10-CM

## 2024-08-23 DIAGNOSIS — R10.30 LOWER ABDOMINAL PAIN: ICD-10-CM

## 2024-08-23 DIAGNOSIS — K57.92 ACUTE DIVERTICULITIS OF INTESTINE: ICD-10-CM

## 2024-08-23 PROBLEM — M62.81 MUSCLE WEAKNESS: Status: ACTIVE | Noted: 2024-08-11

## 2024-08-23 PROBLEM — G89.29 CHRONIC MIDLINE LOW BACK PAIN WITHOUT SCIATICA: Status: ACTIVE | Noted: 2024-08-23

## 2024-08-23 PROBLEM — R20.2 PARESTHESIA: Status: ACTIVE | Noted: 2024-08-11

## 2024-08-23 PROBLEM — M54.50 CHRONIC MIDLINE LOW BACK PAIN WITHOUT SCIATICA: Status: ACTIVE | Noted: 2024-08-23

## 2024-08-23 PROBLEM — D72.829 LEUKOCYTOSIS: Status: ACTIVE | Noted: 2024-08-23

## 2024-08-23 LAB
ALBUMIN SERPL BCP-MCNC: 4.4 G/DL (ref 3.2–4.9)
ALBUMIN/GLOB SERPL: 1.4 G/DL
ALP SERPL-CCNC: 75 U/L (ref 30–99)
ALT SERPL-CCNC: 50 U/L (ref 2–50)
ANION GAP SERPL CALC-SCNC: 14 MMOL/L (ref 7–16)
APPEARANCE UR: CLEAR
APPEARANCE UR: CLEAR
AST SERPL-CCNC: 19 U/L (ref 12–45)
BASOPHILS # BLD AUTO: 0.3 % (ref 0–1.8)
BASOPHILS # BLD: 0.05 K/UL (ref 0–0.12)
BILIRUB SERPL-MCNC: 1.1 MG/DL (ref 0.1–1.5)
BILIRUB UR QL STRIP.AUTO: NEGATIVE
BILIRUB UR STRIP-MCNC: NEGATIVE MG/DL
BUN SERPL-MCNC: 17 MG/DL (ref 8–22)
CALCIUM ALBUM COR SERPL-MCNC: 9.1 MG/DL (ref 8.5–10.5)
CALCIUM SERPL-MCNC: 9.4 MG/DL (ref 8.4–10.2)
CHLORIDE SERPL-SCNC: 99 MMOL/L (ref 96–112)
CO2 SERPL-SCNC: 24 MMOL/L (ref 20–33)
COLOR UR AUTO: NORMAL
COLOR UR: YELLOW
CREAT SERPL-MCNC: 1.01 MG/DL (ref 0.5–1.4)
EOSINOPHIL # BLD AUTO: 0.05 K/UL (ref 0–0.51)
EOSINOPHIL NFR BLD: 0.3 % (ref 0–6.9)
ERYTHROCYTE [DISTWIDTH] IN BLOOD BY AUTOMATED COUNT: 39.9 FL (ref 35.9–50)
GFR SERPLBLD CREATININE-BSD FMLA CKD-EPI: 100 ML/MIN/1.73 M 2
GLOBULIN SER CALC-MCNC: 3.1 G/DL (ref 1.9–3.5)
GLUCOSE SERPL-MCNC: 100 MG/DL (ref 65–99)
GLUCOSE UR STRIP.AUTO-MCNC: NEGATIVE MG/DL
GLUCOSE UR STRIP.AUTO-MCNC: NEGATIVE MG/DL
HCT VFR BLD AUTO: 50.5 % (ref 42–52)
HGB BLD-MCNC: 17 G/DL (ref 14–18)
IMM GRANULOCYTES # BLD AUTO: 0.08 K/UL (ref 0–0.11)
IMM GRANULOCYTES NFR BLD AUTO: 0.5 % (ref 0–0.9)
KETONES UR STRIP.AUTO-MCNC: NEGATIVE MG/DL
KETONES UR STRIP.AUTO-MCNC: NEGATIVE MG/DL
LACTATE SERPL-SCNC: 0.8 MMOL/L (ref 0.5–2)
LEUKOCYTE ESTERASE UR QL STRIP.AUTO: NEGATIVE
LEUKOCYTE ESTERASE UR QL STRIP.AUTO: NEGATIVE
LIPASE SERPL-CCNC: 16 U/L (ref 11–82)
LYMPHOCYTES # BLD AUTO: 1.58 K/UL (ref 1–4.8)
LYMPHOCYTES NFR BLD: 9.6 % (ref 22–41)
MCH RBC QN AUTO: 29.6 PG (ref 27–33)
MCHC RBC AUTO-ENTMCNC: 33.7 G/DL (ref 32.3–36.5)
MCV RBC AUTO: 87.8 FL (ref 81.4–97.8)
MICRO URNS: NORMAL
MONOCYTES # BLD AUTO: 1.35 K/UL (ref 0–0.85)
MONOCYTES NFR BLD AUTO: 8.2 % (ref 0–13.4)
NEUTROPHILS # BLD AUTO: 13.41 K/UL (ref 1.82–7.42)
NEUTROPHILS NFR BLD: 81.1 % (ref 44–72)
NITRITE UR QL STRIP.AUTO: NEGATIVE
NITRITE UR QL STRIP.AUTO: NEGATIVE
NRBC # BLD AUTO: 0 K/UL
NRBC BLD-RTO: 0 /100 WBC (ref 0–0.2)
PH UR STRIP.AUTO: 6.5 [PH] (ref 5–8)
PH UR STRIP.AUTO: 7.5 [PH] (ref 5–8)
PLATELET # BLD AUTO: 301 K/UL (ref 164–446)
PMV BLD AUTO: 9.8 FL (ref 9–12.9)
POTASSIUM SERPL-SCNC: 4.4 MMOL/L (ref 3.6–5.5)
PROCALCITONIN SERPL-MCNC: 0.08 NG/ML
PROT SERPL-MCNC: 7.5 G/DL (ref 6–8.2)
PROT UR QL STRIP: NEGATIVE MG/DL
PROT UR QL STRIP: NORMAL MG/DL
RBC # BLD AUTO: 5.75 M/UL (ref 4.7–6.1)
RBC UR QL AUTO: NEGATIVE
RBC UR QL AUTO: NEGATIVE
SODIUM SERPL-SCNC: 137 MMOL/L (ref 135–145)
SP GR UR STRIP.AUTO: 1.01
SP GR UR STRIP.AUTO: 1.02
UROBILINOGEN UR STRIP-MCNC: NORMAL MG/DL
WBC # BLD AUTO: 16.5 K/UL (ref 4.8–10.8)

## 2024-08-23 PROCEDURE — 700102 HCHG RX REV CODE 250 W/ 637 OVERRIDE(OP): Performed by: HOSPITALIST

## 2024-08-23 PROCEDURE — 83605 ASSAY OF LACTIC ACID: CPT

## 2024-08-23 PROCEDURE — 85025 COMPLETE CBC W/AUTO DIFF WBC: CPT

## 2024-08-23 PROCEDURE — 99214 OFFICE O/P EST MOD 30 MIN: CPT | Performed by: NURSE PRACTITIONER

## 2024-08-23 PROCEDURE — A9270 NON-COVERED ITEM OR SERVICE: HCPCS | Performed by: HOSPITALIST

## 2024-08-23 PROCEDURE — 770001 HCHG ROOM/CARE - MED/SURG/GYN PRIV*

## 2024-08-23 PROCEDURE — 83690 ASSAY OF LIPASE: CPT

## 2024-08-23 PROCEDURE — 96365 THER/PROPH/DIAG IV INF INIT: CPT

## 2024-08-23 PROCEDURE — 36415 COLL VENOUS BLD VENIPUNCTURE: CPT

## 2024-08-23 PROCEDURE — 3078F DIAST BP <80 MM HG: CPT | Performed by: NURSE PRACTITIONER

## 2024-08-23 PROCEDURE — 84145 PROCALCITONIN (PCT): CPT

## 2024-08-23 PROCEDURE — 700105 HCHG RX REV CODE 258: Performed by: HOSPITALIST

## 2024-08-23 PROCEDURE — 81002 URINALYSIS NONAUTO W/O SCOPE: CPT | Performed by: NURSE PRACTITIONER

## 2024-08-23 PROCEDURE — 83630 LACTOFERRIN FECAL (QUAL): CPT

## 2024-08-23 PROCEDURE — 74177 CT ABD & PELVIS W/CONTRAST: CPT

## 2024-08-23 PROCEDURE — 99285 EMERGENCY DEPT VISIT HI MDM: CPT

## 2024-08-23 PROCEDURE — 81003 URINALYSIS AUTO W/O SCOPE: CPT

## 2024-08-23 PROCEDURE — 3074F SYST BP LT 130 MM HG: CPT | Performed by: NURSE PRACTITIONER

## 2024-08-23 PROCEDURE — 94760 N-INVAS EAR/PLS OXIMETRY 1: CPT

## 2024-08-23 PROCEDURE — 80053 COMPREHEN METABOLIC PANEL: CPT

## 2024-08-23 PROCEDURE — 700111 HCHG RX REV CODE 636 W/ 250 OVERRIDE (IP): Mod: JZ | Performed by: HOSPITALIST

## 2024-08-23 PROCEDURE — 96375 TX/PRO/DX INJ NEW DRUG ADDON: CPT

## 2024-08-23 PROCEDURE — 99223 1ST HOSP IP/OBS HIGH 75: CPT | Performed by: HOSPITALIST

## 2024-08-23 PROCEDURE — 700117 HCHG RX CONTRAST REV CODE 255: Performed by: EMERGENCY MEDICINE

## 2024-08-23 PROCEDURE — 700111 HCHG RX REV CODE 636 W/ 250 OVERRIDE (IP): Mod: JZ | Performed by: EMERGENCY MEDICINE

## 2024-08-23 RX ORDER — CYCLOBENZAPRINE HCL 10 MG
10 TABLET ORAL
Status: DISCONTINUED | OUTPATIENT
Start: 2024-08-23 | End: 2024-08-26 | Stop reason: HOSPADM

## 2024-08-23 RX ORDER — AMOXICILLIN 250 MG
2 CAPSULE ORAL EVERY EVENING
Status: DISCONTINUED | OUTPATIENT
Start: 2024-08-23 | End: 2024-08-26 | Stop reason: HOSPADM

## 2024-08-23 RX ORDER — POLYETHYLENE GLYCOL 3350 17 G/17G
1 POWDER, FOR SOLUTION ORAL
Status: DISCONTINUED | OUTPATIENT
Start: 2024-08-23 | End: 2024-08-26 | Stop reason: HOSPADM

## 2024-08-23 RX ORDER — PROCHLORPERAZINE EDISYLATE 5 MG/ML
5-10 INJECTION INTRAMUSCULAR; INTRAVENOUS EVERY 4 HOURS PRN
Status: DISCONTINUED | OUTPATIENT
Start: 2024-08-23 | End: 2024-08-26 | Stop reason: HOSPADM

## 2024-08-23 RX ORDER — ONDANSETRON 2 MG/ML
4 INJECTION INTRAMUSCULAR; INTRAVENOUS ONCE
Status: COMPLETED | OUTPATIENT
Start: 2024-08-23 | End: 2024-08-23

## 2024-08-23 RX ORDER — METHOCARBAMOL 500 MG/1
1000 TABLET, FILM COATED ORAL
Status: DISCONTINUED | OUTPATIENT
Start: 2024-08-23 | End: 2024-08-26 | Stop reason: HOSPADM

## 2024-08-23 RX ORDER — NAPROXEN SODIUM 220 MG
220 TABLET ORAL
Status: DISCONTINUED | OUTPATIENT
Start: 2024-08-23 | End: 2024-08-23

## 2024-08-23 RX ORDER — SODIUM CHLORIDE 9 MG/ML
INJECTION, SOLUTION INTRAVENOUS CONTINUOUS
Status: DISCONTINUED | OUTPATIENT
Start: 2024-08-23 | End: 2024-08-26 | Stop reason: HOSPADM

## 2024-08-23 RX ORDER — ACETAMINOPHEN 325 MG/1
650 TABLET ORAL EVERY 6 HOURS PRN
Status: DISCONTINUED | OUTPATIENT
Start: 2024-08-23 | End: 2024-08-26 | Stop reason: HOSPADM

## 2024-08-23 RX ORDER — METHOCARBAMOL 500 MG/1
1000 TABLET, FILM COATED ORAL
COMMUNITY

## 2024-08-23 RX ORDER — CYCLOBENZAPRINE HCL 10 MG
10 TABLET ORAL
COMMUNITY

## 2024-08-23 RX ORDER — CYCLOBENZAPRINE HCL 10 MG
10 TABLET ORAL 3 TIMES DAILY PRN
COMMUNITY
End: 2024-08-23

## 2024-08-23 RX ORDER — ONDANSETRON 4 MG/1
4 TABLET, ORALLY DISINTEGRATING ORAL EVERY 4 HOURS PRN
Status: DISCONTINUED | OUTPATIENT
Start: 2024-08-23 | End: 2024-08-26 | Stop reason: HOSPADM

## 2024-08-23 RX ORDER — OXYCODONE HYDROCHLORIDE 5 MG/1
5 TABLET ORAL
Status: DISCONTINUED | OUTPATIENT
Start: 2024-08-23 | End: 2024-08-26 | Stop reason: HOSPADM

## 2024-08-23 RX ORDER — PROMETHAZINE HYDROCHLORIDE 25 MG/1
12.5-25 TABLET ORAL EVERY 4 HOURS PRN
Status: DISCONTINUED | OUTPATIENT
Start: 2024-08-23 | End: 2024-08-26 | Stop reason: HOSPADM

## 2024-08-23 RX ORDER — MORPHINE SULFATE 4 MG/ML
4 INJECTION INTRAVENOUS ONCE
Status: COMPLETED | OUTPATIENT
Start: 2024-08-23 | End: 2024-08-23

## 2024-08-23 RX ORDER — ONDANSETRON 2 MG/ML
4 INJECTION INTRAMUSCULAR; INTRAVENOUS EVERY 4 HOURS PRN
Status: DISCONTINUED | OUTPATIENT
Start: 2024-08-23 | End: 2024-08-26 | Stop reason: HOSPADM

## 2024-08-23 RX ORDER — LABETALOL HYDROCHLORIDE 5 MG/ML
10 INJECTION, SOLUTION INTRAVENOUS EVERY 4 HOURS PRN
Status: DISCONTINUED | OUTPATIENT
Start: 2024-08-23 | End: 2024-08-26 | Stop reason: HOSPADM

## 2024-08-23 RX ORDER — MORPHINE SULFATE 4 MG/ML
2 INJECTION INTRAVENOUS
Status: DISCONTINUED | OUTPATIENT
Start: 2024-08-23 | End: 2024-08-26 | Stop reason: HOSPADM

## 2024-08-23 RX ORDER — OXYCODONE HYDROCHLORIDE 5 MG/1
2.5 TABLET ORAL
Status: DISCONTINUED | OUTPATIENT
Start: 2024-08-23 | End: 2024-08-26 | Stop reason: HOSPADM

## 2024-08-23 RX ORDER — PROMETHAZINE HYDROCHLORIDE 25 MG/1
12.5-25 SUPPOSITORY RECTAL EVERY 4 HOURS PRN
Status: DISCONTINUED | OUTPATIENT
Start: 2024-08-23 | End: 2024-08-26 | Stop reason: HOSPADM

## 2024-08-23 RX ORDER — NAPROXEN SODIUM 220 MG
220-440 TABLET ORAL
COMMUNITY

## 2024-08-23 RX ADMIN — SODIUM CHLORIDE: 9 INJECTION, SOLUTION INTRAVENOUS at 16:34

## 2024-08-23 RX ADMIN — IOHEXOL 100 ML: 350 INJECTION, SOLUTION INTRAVENOUS at 14:14

## 2024-08-23 RX ADMIN — MORPHINE SULFATE 4 MG: 4 INJECTION, SOLUTION INTRAMUSCULAR; INTRAVENOUS at 13:26

## 2024-08-23 RX ADMIN — PIPERACILLIN AND TAZOBACTAM 3.38 G: 3; .375 INJECTION, POWDER, FOR SOLUTION INTRAVENOUS at 20:33

## 2024-08-23 RX ADMIN — OXYCODONE HYDROCHLORIDE 5 MG: 5 TABLET ORAL at 22:14

## 2024-08-23 RX ADMIN — PIPERACILLIN AND TAZOBACTAM 4.5 G: 4; .5 INJECTION, POWDER, LYOPHILIZED, FOR SOLUTION INTRAVENOUS; PARENTERAL at 15:52

## 2024-08-23 RX ADMIN — ONDANSETRON 4 MG: 2 INJECTION INTRAMUSCULAR; INTRAVENOUS at 13:26

## 2024-08-23 RX ADMIN — OXYCODONE HYDROCHLORIDE 5 MG: 5 TABLET ORAL at 17:01

## 2024-08-23 ASSESSMENT — PATIENT HEALTH QUESTIONNAIRE - PHQ9
1. LITTLE INTEREST OR PLEASURE IN DOING THINGS: NOT AT ALL
SUM OF ALL RESPONSES TO PHQ9 QUESTIONS 1 AND 2: 0
2. FEELING DOWN, DEPRESSED, IRRITABLE, OR HOPELESS: NOT AT ALL

## 2024-08-23 ASSESSMENT — ENCOUNTER SYMPTOMS
MUSCULOSKELETAL NEGATIVE: 1
VOMITING: 1
PND: 0
DIAPHORESIS: 0
SEIZURES: 0
CONSTITUTIONAL NEGATIVE: 1
PHOTOPHOBIA: 0
SPUTUM PRODUCTION: 0
STRIDOR: 0
WHEEZING: 0
SINUS PAIN: 0
FOCAL WEAKNESS: 0
MYALGIAS: 0
NAUSEA: 1
EYE DISCHARGE: 0
SHORTNESS OF BREATH: 0
TINGLING: 0
CARDIOVASCULAR NEGATIVE: 1
BRUISES/BLEEDS EASILY: 0
EYE REDNESS: 0
SENSORY CHANGE: 0
ORTHOPNEA: 0
INSOMNIA: 0
DEPRESSION: 0
COUGH: 0
BACK PAIN: 0
NEUROLOGICAL NEGATIVE: 1
EYES NEGATIVE: 1
RESPIRATORY NEGATIVE: 1
FEVER: 0
CHILLS: 0
POLYDIPSIA: 0
FALLS: 0
FLANK PAIN: 0
PSYCHIATRIC NEGATIVE: 1
DOUBLE VISION: 0
HEARTBURN: 0
DIZZINESS: 0
ABDOMINAL PAIN: 1
BLOOD IN STOOL: 0

## 2024-08-23 ASSESSMENT — PAIN DESCRIPTION - DESCRIPTORS: DESCRIPTORS: ACHING

## 2024-08-23 ASSESSMENT — PAIN DESCRIPTION - PAIN TYPE
TYPE: ACUTE PAIN

## 2024-08-23 ASSESSMENT — FIBROSIS 4 INDEX: FIB4 SCORE: 0.29

## 2024-08-23 ASSESSMENT — LIFESTYLE VARIABLES: SUBSTANCE_ABUSE: 0

## 2024-08-23 NOTE — ED TRIAGE NOTES
Chief Complaint   Patient presents with    Abdominal Pain      Lower abdominal pain for the past 3 days, denies n/v. Sent from  for possible appendicitis.

## 2024-08-23 NOTE — PROGRESS NOTES
Chief Complaint   Patient presents with    Abdominal Pain     Pt has abdominal pain, lower back pain x 3 days        HISTORY OF PRESENT ILLNESS: Patient is a pleasant 33 y.o. male who presents to urgent care today with complaints of lower abdominal pain.  Patient notes that for the past 3 days he has had symptoms, gradually worsening.  Symptoms started with generalized low back pain, has now radiated to the lower abdomen.  Pain is constant with intermittent worsening.  Endorses malaise, fatigue.  Denies any fever.  His last bowel movement was this morning, normal for him.  Denies any urinary symptoms.    Patient Active Problem List    Diagnosis Date Noted    Muscle weakness 08/11/2024    Paresthesia 08/11/2024    Dyspnea on exertion 12/30/2021    Chest pain 12/30/2021    Costochondral chest pain 12/30/2021    Marijuana use 12/30/2021    Myalgia 06/04/2021    Cervicalgia 06/04/2021    Sleep disturbance 06/04/2021    Headache 04/07/2021    Visual disorder 04/07/2021    Elbow pain, chronic, unspecified laterality 11/07/2017    Grade I hemorrhoids 11/07/2017    Eczema 11/07/2017    Obesity (BMI 30-39.9) 11/07/2017    Bruxism 11/07/2017    Genital warts 07/16/2015    ADD (attention deficit disorder) 07/16/2015       Allergies:Tramadol    Current Outpatient Medications Ordered in Epic   Medication Sig Dispense Refill    Simethicone (GAS RELIEF PO) Take  by mouth.      cyclobenzaprine (FLEXERIL) 10 mg Tab Take 10 mg by mouth 3 times a day as needed.      methocarbamol (ROBAXIN) 500 MG Tab Take 1 Tablet by mouth 3 times a day. 30 Tablet 0     No current Epic-ordered facility-administered medications on file.       Past Medical History:   Diagnosis Date    ADHD     Anxiety     Chickenpox     Cough     Painful breathing     Shortness of breath     Sputum production     Substance abuse (HCC)     Wheezing        Social History     Tobacco Use    Smoking status: Former     Current packs/day: 0.00     Average packs/day: 0.5  "packs/day for 12.0 years (6.0 ttl pk-yrs)     Types: Cigarettes     Start date: 2005     Quit date: 2017     Years since quittin.5    Smokeless tobacco: Never   Vaping Use    Vaping status: Some Days    Substances: THC, slight    Devices: Disposable   Substance Use Topics    Alcohol use: Yes     Comment: rare    Drug use: Yes     Types: Marijuana, Inhaled     Comment: marijuana       Family Status   Relation Name Status    Fa  (Not Specified)    Bro  (Not Specified)    MGFa  (Not Specified)    Mo  (Not Specified)    Neg Hx  (Not Specified)   No partnership data on file     Family History   Problem Relation Age of Onset    Psychiatric Illness Father         suicide    Cancer Brother         lung cancer, 43    Alcohol/Drug Brother         meth, heroine    Cancer Maternal Grandfather         lung cancer    Hypertension Mother     Diabetes Neg Hx     Heart Disease Neg Hx        ROS:  Review of Systems   Constitutional: Positive for malaise, fatigue.  Negative for fever, chills, weight loss.  HENT: Negative for ear pain, nosebleeds, congestion, sore throat and neck pain.    Eyes: Negative for vision changes.   Neuro: Negative for headache, sensory changes, weakness, seizure, LOC.   Cardiovascular: Negative for chest pain, palpitations, orthopnea and leg swelling.   Respiratory: Negative for cough, sputum production, shortness of breath and wheezing.   Gastrointestinal: Positive for abdominal pain.  Negative for nausea, vomiting or diarrhea.   Genitourinary: Negative for dysuria, urgency and frequency.  Musculoskeletal: Negative for falls, neck pain, back pain, joint pain, myalgias.   Skin: Negative for rash, diaphoresis.     Exam:  /66 (BP Location: Left arm, Patient Position: Sitting, BP Cuff Size: Large adult)   Pulse (!) 102   Temp 36.5 °C (97.7 °F) (Temporal)   Resp 17   Ht 1.854 m (6' 1\")   Wt 121 kg (266 lb 4.8 oz)   SpO2 97%   General: well-nourished, well-developed male in NAD  Head: " normocephalic, atraumatic  Eyes: PERRLA, no conjunctival injection, acuity grossly intact, lids normal.  Ears: normal shape and symmetry, no tenderness, no discharge. External canals are without any significant edema or erythema. Tympanic membranes are without any inflammation, no effusion. Gross auditory acuity is intact.  Nose: symmetrical without tenderness, no discharge.  Mouth/Throat: reasonable hygiene, no erythema, exudates or tonsillar enlargement.  Neck: no masses, range of motion within normal limits, no tracheal deviation. No obvious thyroid enlargement.   Lymph: no cervical adenopathy. No supraclavicular adenopathy.   Neuro: alert and oriented. Cranial nerves 1-12 grossly intact. No sensory deficit.   Cardiovascular: Tachycardic rate and regular rhythm. No edema.  Pulmonary: no distress. Chest is symmetrical with respiration, no wheezes, crackles, or rhonchi.   Abdomen: soft, tenderness to lower abdomen, primarily midline, no guarding, no hepatosplenomegaly.  No CVA tenderness.    Musculoskeletal: no clubbing, appropriate muscle tone, gait is stable.  Skin: warm, dry, intact, no clubbing, no cyanosis, no rashes.   Psych: appropriate mood, affect, judgement.         Assessment/Plan:  1. Lower abdominal pain  POCT Urinalysis          Patient is a pleasant 33-year-old who presents with lower abdominal pain. Differential diagnoses include but are not limited to: Hernia, appendicitis, colitis, diverticulitis, constipation. At this time, I feel the patient requires a higher level of care in the ED for closer monitoring, stat lab work and/or imaging for further evaluation. This has been discussed with the patient and he states agreement and understanding.  The patient is stable to leave PO at this time and will go directly to ED without delay, instructed remain n.p.o.           Please note that this dictation was created using voice recognition software. I have made every reasonable attempt to correct obvious  errors, but I expect that there are errors of grammar and possibly content that I did not discover before finalizing the note.      ZONIA Blanton.

## 2024-08-23 NOTE — ED PROVIDER NOTES
ED Provider Note    CHIEF COMPLAINT  Chief Complaint   Patient presents with    Abdominal Pain       EXTERNAL RECORDS REVIEWED  Outpatient Notes urgent care note from earlier today reviewed    HPI/ROS  LIMITATION TO HISTORY   Select: : None  OUTSIDE HISTORIAN(S):  Significant other at bedside providing additional history and insight    Miky Dav Khanna is a 33 y.o. male who presents to the emergency department complaining of lower abdominal discomfort.  Past medical history as document below.      Over the last 3 to 4 days has had ongoing abdominal discomfort.  It has been mild the first few days however more present for the last 24 hours or so.  Went to urgent care where he had initial workup completed and he was transferred to the ER for further intra-abdominal process evaluation to include a differential of appendicitis.  He notes that the pain waxes and wanes.  At times it is as severe as 8 out of 10 although this is only for seconds and then more moderate to mild discomfort.  Slight discomfort with urination.  Noted blood in urine.  No history of kidney stones.  No prior intra-abdominal surgeries.  No current nausea.  No fever or chills.    Admits to drinking 2 Celsius energy drinks per day.    PAST MEDICAL HISTORY   has a past medical history of ADHD, Anxiety, Chickenpox, Cough, Painful breathing, Shortness of breath, Sputum production, Substance abuse (HCC), and Wheezing.    SURGICAL HISTORY  patient denies any surgical history    FAMILY HISTORY  Family History   Problem Relation Age of Onset    Psychiatric Illness Father         suicide    Cancer Brother         lung cancer, 43    Alcohol/Drug Brother         meth, heroine    Cancer Maternal Grandfather         lung cancer    Hypertension Mother     Diabetes Neg Hx     Heart Disease Neg Hx        SOCIAL HISTORY  Social History     Tobacco Use    Smoking status: Former     Current packs/day: 0.00     Average packs/day: 0.5 packs/day for 12.0 years (6.0 ttl  "pk-yrs)     Types: Cigarettes     Start date: 2005     Quit date: 2017     Years since quittin.5    Smokeless tobacco: Never   Vaping Use    Vaping status: Some Days    Substances: THC, slight    Devices: Disposable   Substance and Sexual Activity    Alcohol use: Yes     Comment: rare    Drug use: Yes     Types: Marijuana, Inhaled     Comment: marijuana    Sexual activity: Yes     Partners: Female     Comment: no STD, two children       CURRENT MEDICATIONS  Home Medications       Reviewed by Kamaljit Leon M.D. (Physician) on 24 at 1528  Med List Status: Complete     Medication Last Dose Status   cyclobenzaprine (FLEXERIL) 10 mg Tab ABOUT 1 WEEK Active   methocarbamol (ROBAXIN) 500 MG Tab ABOUT 1 WEEK Active   naproxen (ALEVE) 220 MG tablet ABOUT 1 WEEK Active                  Audit from Redirected Encounters    **Home medications have not yet been reviewed for this encounter**         ALLERGIES  Allergies   Allergen Reactions    Tramadol Unspecified     Acid refulx       PHYSICAL EXAM  VITAL SIGNS: /72   Pulse 98   Temp 36.4 °C (97.6 °F) (Temporal)   Resp 18   Ht 1.854 m (6' 1\")   Wt (!) 129 kg (284 lb 2.8 oz)   SpO2 92%   BMI 37.49 kg/m²      Pulse ox interpretation: I interpret this pulse ox as normal.  Constitutional: Alert in no apparent distress.  HENT: No signs of trauma, Bilateral external ears normal, Nose normal.   Cardiovascular: Regular rate and rhythm, no murmurs.   Thorax & Lungs: Normal breath sounds, No respiratory distress  Abdomen: Bowel sounds normal, Soft, diffuse lower abd. tenderness  Skin: Warm, Dry, No erythema, No rash.   Back: No bony tenderness, No CVA tenderness.   Musculoskeletal: Good range of motion in all major joints. No tenderness to palpation or major deformities noted.   Neurologic: Alert , Normal motor function, Normal sensory function, No focal deficits noted.   Psychiatric: Affect normal, Judgment normal, Mood normal.         EKG/LABS  Results for " orders placed or performed during the hospital encounter of 08/23/24   CBC WITH DIFFERENTIAL   Result Value Ref Range    WBC 16.5 (H) 4.8 - 10.8 K/uL    RBC 5.75 4.70 - 6.10 M/uL    Hemoglobin 17.0 14.0 - 18.0 g/dL    Hematocrit 50.5 42.0 - 52.0 %    MCV 87.8 81.4 - 97.8 fL    MCH 29.6 27.0 - 33.0 pg    MCHC 33.7 32.3 - 36.5 g/dL    RDW 39.9 35.9 - 50.0 fL    Platelet Count 301 164 - 446 K/uL    MPV 9.8 9.0 - 12.9 fL    Neutrophils-Polys 81.10 (H) 44.00 - 72.00 %    Lymphocytes 9.60 (L) 22.00 - 41.00 %    Monocytes 8.20 0.00 - 13.40 %    Eosinophils 0.30 0.00 - 6.90 %    Basophils 0.30 0.00 - 1.80 %    Immature Granulocytes 0.50 0.00 - 0.90 %    Nucleated RBC 0.00 0.00 - 0.20 /100 WBC    Neutrophils (Absolute) 13.41 (H) 1.82 - 7.42 K/uL    Lymphs (Absolute) 1.58 1.00 - 4.80 K/uL    Monos (Absolute) 1.35 (H) 0.00 - 0.85 K/uL    Eos (Absolute) 0.05 0.00 - 0.51 K/uL    Baso (Absolute) 0.05 0.00 - 0.12 K/uL    Immature Granulocytes (abs) 0.08 0.00 - 0.11 K/uL    NRBC (Absolute) 0.00 K/uL   COMP METABOLIC PANEL   Result Value Ref Range    Sodium 137 135 - 145 mmol/L    Potassium 4.4 3.6 - 5.5 mmol/L    Chloride 99 96 - 112 mmol/L    Co2 24 20 - 33 mmol/L    Anion Gap 14.0 7.0 - 16.0    Glucose 100 (H) 65 - 99 mg/dL    Bun 17 8 - 22 mg/dL    Creatinine 1.01 0.50 - 1.40 mg/dL    Calcium 9.4 8.4 - 10.2 mg/dL    Correct Calcium 9.1 8.5 - 10.5 mg/dL    AST(SGOT) 19 12 - 45 U/L    ALT(SGPT) 50 2 - 50 U/L    Alkaline Phosphatase 75 30 - 99 U/L    Total Bilirubin 1.1 0.1 - 1.5 mg/dL    Albumin 4.4 3.2 - 4.9 g/dL    Total Protein 7.5 6.0 - 8.2 g/dL    Globulin 3.1 1.9 - 3.5 g/dL    A-G Ratio 1.4 g/dL   LIPASE   Result Value Ref Range    Lipase 16 11 - 82 U/L   URINALYSIS    Specimen: Urine   Result Value Ref Range    Color Yellow     Character Clear     Specific Gravity 1.010 <1.035    Ph 6.5 5.0 - 8.0    Glucose Negative Negative mg/dL    Ketones Negative Negative mg/dL    Protein Negative Negative mg/dL    Bilirubin Negative  Negative    Nitrite Negative Negative    Leukocyte Esterase Negative Negative    Occult Blood Negative Negative    Micro Urine Req see below    ESTIMATED GFR   Result Value Ref Range    GFR (CKD-EPI) 100 >60 mL/min/1.73 m 2   PROCALCITONIN   Result Value Ref Range    Procalcitonin 0.08 <0.25 ng/mL   LACTIC ACID   Result Value Ref Range    Lactic Acid 0.8 0.5 - 2.0 mmol/L       I have independently interpreted this EKG    RADIOLOGY/PROCEDURES   I have independently interpreted the diagnostic imaging associated with this visit and am waiting the final reading from the radiologist.   My preliminary interpretation is as follows: No large amount of free air    Radiologist interpretation:  CT-ABDOMEN-PELVIS WITH   Final Result         1. Fatty infiltration of the liver.   2. Diverticulosis with acute sigmoid diverticulitis and suspicion for subtle microperforation.   3. 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          COURSE & MEDICAL DECISION MAKING    ASSESSMENT, COURSE AND PLAN  Care Narrative: 33-year-old male presenting to the emergency department with above presentation.  Initially seen urgent care and then sent here for further workup.  Will complete labs and advanced imaging to rule out broader differential for intra-abdominal processes.  Patient will be kept NPO.      DISPOSITION AND DISCUSSIONS  I have discussed management of the patient with the following physicians and NICHELLE's: General surgery Dr. Schuler and hospitalist    Discussion of management with other [facility] or appropriate source(s): Pharmacy for medication verification        33-year-old male presenting to the emerged from with above presentation.  No evidence of appendicitis but he does have diverticular disease with possible microperforation.  I have consulted with both the general surgeon on-call for further consultation abilities and will have the patient brought in primarily under the hospitalist service for the above pathology is identified.  Patient  has again been kept NPO.  IV fluids will be started as well as antibiotics at this time given clinical findings.  Patient understanding of today's evaluation, findings and inpatient plan.        FINAL DIAGNOSIS  1. Perforated diverticulum         Electronically signed by: Rony Brown M.D., 8/23/2024 1:22 PM

## 2024-08-23 NOTE — H&P
Hospital Medicine History & Physical Note    Date of Service  8/23/2024    Primary Care Physician  Clarence Montano D.O.    Consultants  general surgery    Specialist Names: Dr. Palmer Schuler    Code Status  Full Code    Chief Complaint  Chief Complaint   Patient presents with    Abdominal Pain       History of Presenting Illness  Miky Khanna is a 33 y.o. male who presented 8/23/2024 with abdominal pain.  Patient says the abdominal pain began about 3 days ago and has progressively gotten worse.  The pain is now 10 out of 10 and it is in the right and left lower quadrant and suprapubic region.  The pain at this point is reproducible with percussion and auscultation.  Imaging studies reveal acute diverticulitis with microperforation.  Patient's diet is extremely poor and he usually eats steak and potatoes and very few fruits and vegetables and he does not drink adequate amounts of water to eat as he works as a  and is outside all the time.  At this point we have discussed the necessity for him to optimize his diet.  Patient will need at this point IV antibiotics in house and fluid resuscitation and pain management..    I discussed the plan of care with patient, bedside RN, and emergency room physician Dr Rony Brown.    Review of Systems  Review of Systems   Constitutional: Negative.  Negative for chills, diaphoresis and fever.   HENT: Negative.  Negative for nosebleeds and sinus pain.    Eyes: Negative.  Negative for double vision, photophobia, discharge and redness.   Respiratory: Negative.  Negative for cough, sputum production, shortness of breath, wheezing and stridor.    Cardiovascular: Negative.  Negative for chest pain, orthopnea, leg swelling and PND.   Gastrointestinal:  Positive for abdominal pain, nausea and vomiting. Negative for blood in stool and heartburn.   Genitourinary: Negative.  Negative for dysuria, flank pain and frequency.   Musculoskeletal: Negative.  Negative for back pain, falls  and myalgias.   Skin: Negative.  Negative for itching.   Neurological: Negative.  Negative for dizziness, tingling, sensory change, focal weakness and seizures.   Endo/Heme/Allergies: Negative.  Negative for polydipsia. Does not bruise/bleed easily.   Psychiatric/Behavioral: Negative.  Negative for depression, substance abuse and suicidal ideas. The patient does not have insomnia.    All other systems reviewed and are negative.      Past Medical History   has a past medical history of ADHD, Anxiety, Chickenpox, Cough, Painful breathing, Shortness of breath, Sputum production, Substance abuse (HCC), and Wheezing.    Surgical History   has no past surgical history on file.     Family History  family history includes Alcohol/Drug in his brother; Cancer in his brother and maternal grandfather; Hypertension in his mother; Psychiatric Illness in his father.   Family history reviewed with patient. There is no family history that is pertinent to the chief complaint.     Social History   reports that he quit smoking about 7 years ago. His smoking use included cigarettes. He started smoking about 19 years ago. He has a 6 pack-year smoking history. He has never used smokeless tobacco. He reports current alcohol use. He reports current drug use. Drugs: Marijuana and Inhaled.    Allergies  Allergies   Allergen Reactions    Tramadol Unspecified     Acid refulx       Medications  Prior to Admission Medications   Prescriptions Last Dose Informant Patient Reported? Taking?   cyclobenzaprine (FLEXERIL) 10 mg Tab ABOUT 1 WEEK at PRN Patient Yes Yes   Sig: Take 10 mg by mouth 1 time a day as needed. Indications: Muscle Spasm   methocarbamol (ROBAXIN) 500 MG Tab ABOUT 1 WEEK at PRN Patient Yes Yes   Sig: Take 1,000 mg by mouth 1 time a day as needed. Indications: Musculoskeletal Pain   naproxen (ALEVE) 220 MG tablet ABOUT 1 WEEK at PRN Patient Yes Yes   Sig: Take 220-440 mg by mouth 1 time a day as needed. Indications: Pain       Facility-Administered Medications: None       Physical Exam  Temp:  [36.5 °C (97.7 °F)-37.3 °C (99.1 °F)] 37.3 °C (99.1 °F)  Pulse:  [] 92  Resp:  [17-20] 18  BP: (104-133)/(66-79) 133/79  SpO2:  [91 %-97 %] 91 %  Blood Pressure: 133/79   Temperature: 37.3 °C (99.1 °F)   Pulse: 92   Respiration: 18   Pulse Oximetry: 91 %       Physical Exam  Vitals and nursing note reviewed. Exam conducted with a chaperone present.   Constitutional:       General: He is not in acute distress.     Appearance: Normal appearance. He is well-developed and normal weight. He is not ill-appearing, toxic-appearing or diaphoretic.   HENT:      Head: Normocephalic and atraumatic.      Right Ear: External ear normal.      Left Ear: External ear normal.      Nose: Nose normal.      Mouth/Throat:      Mouth: Mucous membranes are dry.      Pharynx: Oropharynx is clear.   Eyes:      General:         Right eye: No discharge.         Left eye: No discharge.      Extraocular Movements: Extraocular movements intact.      Conjunctiva/sclera: Conjunctivae normal.      Pupils: Pupils are equal, round, and reactive to light.   Neck:      Thyroid: No thyromegaly.      Vascular: No carotid bruit or JVD.   Cardiovascular:      Rate and Rhythm: Normal rate and regular rhythm.      Pulses: Normal pulses.      Heart sounds: Normal heart sounds.   Pulmonary:      Effort: Pulmonary effort is normal.      Breath sounds: Normal breath sounds.   Chest:      Chest wall: No tenderness.   Abdominal:      General: Abdomen is flat. Bowel sounds are increased. There is distension.      Palpations: Abdomen is soft. There is no mass.      Tenderness: There is abdominal tenderness in the right lower quadrant, suprapubic area and left lower quadrant. There is no guarding or rebound.   Musculoskeletal:         General: Normal range of motion.      Cervical back: Normal range of motion and neck supple.      Right lower leg: No edema.      Left lower leg: No edema.  "  Lymphadenopathy:      Cervical: No cervical adenopathy.   Skin:     General: Skin is warm and dry.      Capillary Refill: Capillary refill takes less than 2 seconds.      Findings: No rash.   Neurological:      General: No focal deficit present.      Mental Status: He is alert and oriented to person, place, and time. Mental status is at baseline.      GCS: GCS eye subscore is 4. GCS verbal subscore is 5. GCS motor subscore is 6.      Cranial Nerves: No cranial nerve deficit.      Deep Tendon Reflexes: Reflexes are normal and symmetric.   Psychiatric:         Mood and Affect: Mood normal.         Behavior: Behavior normal.         Thought Content: Thought content normal.         Judgment: Judgment normal.         Laboratory:  Recent Labs     08/23/24  1240   WBC 16.5*   RBC 5.75   HEMOGLOBIN 17.0   HEMATOCRIT 50.5   MCV 87.8   MCH 29.6   MCHC 33.7   RDW 39.9   PLATELETCT 301   MPV 9.8     Recent Labs     08/23/24  1240   SODIUM 137   POTASSIUM 4.4   CHLORIDE 99   CO2 24   GLUCOSE 100*   BUN 17   CREATININE 1.01   CALCIUM 9.4     Recent Labs     08/23/24  1240   ALTSGPT 50   ASTSGOT 19   ALKPHOSPHAT 75   TBILIRUBIN 1.1   LIPASE 16   GLUCOSE 100*         No results for input(s): \"NTPROBNP\" in the last 72 hours.      No results for input(s): \"TROPONINT\" in the last 72 hours.    Imaging:  CT-ABDOMEN-PELVIS WITH   Final Result         1. Fatty infiltration of the liver.   2. Diverticulosis with acute sigmoid diverticulitis and suspicion for subtle microperforation.   3. 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          X-Ray:  I have personally reviewed the images and compared with prior images.  EKG:  I have personally reviewed the images and compared with prior images.    Assessment/Plan:  Justification for Admission Status  I anticipate this patient will require at least two midnights for appropriate medical management, necessitating inpatient admission because patient will need 48 hours of inpatient management due " to acute diverticulitis with microperforation for antibiotics and fluid resuscitation.  This cannot be accomplished as an outpatient.    Patient will need a Med/Surg bed on MEDICAL service .  The need is secondary to acute diverticulitis with microperforation.    * Acute diverticulitis of intestine- (present on admission)  Assessment & Plan  Patient has developed acute pain in the lower quadrant across the right left and suprapubic region 3 days ago.  The pain has gotten significantly worse.  He comes to the emergency room for evaluation is found to have a white blood cell count of 16,500.  Imaging studies showed acute diverticulitis with microperforation.  Initiate IV Zosyn  Fluid resuscitation  Pain management with IV morphine for severe pain, oxycodone for moderate pain.  Clear liquid diet  Surgical consultation    Leukocytosis  Assessment & Plan  White blood cell count 16,500  This indicates infection  Monitor white blood cell count which should improve as the patient's condition improves.    Chronic midline low back pain without sciatica- (present on admission)  Assessment & Plan  Pain management  If necessary obtain PT OT evaluation    Marijuana use- (present on admission)  Assessment & Plan  Chronic marijuana use which we have discussed cessation on.    Obesity (BMI 30-39.9)- (present on admission)  Assessment & Plan  There is no height or weight on file to calculate BMI.  Outpatient weight loss management program lifestyle modification highly recommended        VTE prophylaxis: SCDs/TEDs

## 2024-08-23 NOTE — ASSESSMENT & PLAN NOTE
WBC count 9.7   imaging studies showed acute diverticulitis with microperforation.  Transition to oral antibiotic with Augmentin  Continue IV fluid resuscitation  Pain management with IV morphine for severe pain, oxycodone for moderate pain.  Advance diet to full liquid, cleared with general surgery  Surgical consultation appreciated, medical management at this time

## 2024-08-24 LAB
ANION GAP SERPL CALC-SCNC: 13 MMOL/L (ref 7–16)
BUN SERPL-MCNC: 15 MG/DL (ref 8–22)
CALCIUM SERPL-MCNC: 8.2 MG/DL (ref 8.4–10.2)
CHLORIDE SERPL-SCNC: 99 MMOL/L (ref 96–112)
CO2 SERPL-SCNC: 22 MMOL/L (ref 20–33)
CREAT SERPL-MCNC: 1.04 MG/DL (ref 0.5–1.4)
ERYTHROCYTE [DISTWIDTH] IN BLOOD BY AUTOMATED COUNT: 40 FL (ref 35.9–50)
GFR SERPLBLD CREATININE-BSD FMLA CKD-EPI: 97 ML/MIN/1.73 M 2
GLUCOSE SERPL-MCNC: 100 MG/DL (ref 65–99)
HCT VFR BLD AUTO: 44.5 % (ref 42–52)
HGB BLD-MCNC: 15 G/DL (ref 14–18)
LACTATE SERPL-SCNC: 0.6 MMOL/L (ref 0.5–2)
LACTOFERRIN STL QL IA: POSITIVE
MCH RBC QN AUTO: 29.7 PG (ref 27–33)
MCHC RBC AUTO-ENTMCNC: 33.7 G/DL (ref 32.3–36.5)
MCV RBC AUTO: 88.1 FL (ref 81.4–97.8)
PLATELET # BLD AUTO: 272 K/UL (ref 164–446)
PMV BLD AUTO: 10.4 FL (ref 9–12.9)
POTASSIUM SERPL-SCNC: 3.8 MMOL/L (ref 3.6–5.5)
RBC # BLD AUTO: 5.05 M/UL (ref 4.7–6.1)
SODIUM SERPL-SCNC: 134 MMOL/L (ref 135–145)
WBC # BLD AUTO: 16.4 K/UL (ref 4.8–10.8)

## 2024-08-24 PROCEDURE — 85027 COMPLETE CBC AUTOMATED: CPT

## 2024-08-24 PROCEDURE — 87899 AGENT NOS ASSAY W/OPTIC: CPT

## 2024-08-24 PROCEDURE — 87045 FECES CULTURE AEROBIC BACT: CPT

## 2024-08-24 PROCEDURE — A9270 NON-COVERED ITEM OR SERVICE: HCPCS | Performed by: HOSPITALIST

## 2024-08-24 PROCEDURE — 83605 ASSAY OF LACTIC ACID: CPT

## 2024-08-24 PROCEDURE — A9270 NON-COVERED ITEM OR SERVICE: HCPCS | Performed by: INTERNAL MEDICINE

## 2024-08-24 PROCEDURE — 700102 HCHG RX REV CODE 250 W/ 637 OVERRIDE(OP): Performed by: INTERNAL MEDICINE

## 2024-08-24 PROCEDURE — 700102 HCHG RX REV CODE 250 W/ 637 OVERRIDE(OP): Performed by: HOSPITALIST

## 2024-08-24 PROCEDURE — 770001 HCHG ROOM/CARE - MED/SURG/GYN PRIV*

## 2024-08-24 PROCEDURE — 94760 N-INVAS EAR/PLS OXIMETRY 1: CPT

## 2024-08-24 PROCEDURE — 700111 HCHG RX REV CODE 636 W/ 250 OVERRIDE (IP): Mod: JZ | Performed by: HOSPITALIST

## 2024-08-24 PROCEDURE — 36415 COLL VENOUS BLD VENIPUNCTURE: CPT

## 2024-08-24 PROCEDURE — 700105 HCHG RX REV CODE 258: Performed by: HOSPITALIST

## 2024-08-24 PROCEDURE — 80048 BASIC METABOLIC PNL TOTAL CA: CPT

## 2024-08-24 PROCEDURE — 87046 STOOL CULTR AEROBIC BACT EA: CPT

## 2024-08-24 PROCEDURE — 99232 SBSQ HOSP IP/OBS MODERATE 35: CPT | Performed by: INTERNAL MEDICINE

## 2024-08-24 RX ADMIN — OXYCODONE HYDROCHLORIDE 5 MG: 5 TABLET ORAL at 17:18

## 2024-08-24 RX ADMIN — ACETAMINOPHEN 650 MG: 325 TABLET ORAL at 17:19

## 2024-08-24 RX ADMIN — MORPHINE SULFATE 2 MG: 4 INJECTION, SOLUTION INTRAMUSCULAR; INTRAVENOUS at 18:19

## 2024-08-24 RX ADMIN — OXYCODONE HYDROCHLORIDE 5 MG: 5 TABLET ORAL at 07:41

## 2024-08-24 RX ADMIN — OXYCODONE HYDROCHLORIDE 5 MG: 5 TABLET ORAL at 02:42

## 2024-08-24 RX ADMIN — SODIUM CHLORIDE: 9 INJECTION, SOLUTION INTRAVENOUS at 23:47

## 2024-08-24 RX ADMIN — SODIUM CHLORIDE: 9 INJECTION, SOLUTION INTRAVENOUS at 01:50

## 2024-08-24 RX ADMIN — ONDANSETRON 4 MG: 2 INJECTION INTRAMUSCULAR; INTRAVENOUS at 09:50

## 2024-08-24 RX ADMIN — Medication 1 LOZENGE: at 09:49

## 2024-08-24 RX ADMIN — PIPERACILLIN AND TAZOBACTAM 3.38 G: 3; .375 INJECTION, POWDER, FOR SOLUTION INTRAVENOUS at 21:17

## 2024-08-24 RX ADMIN — SODIUM CHLORIDE: 9 INJECTION, SOLUTION INTRAVENOUS at 13:10

## 2024-08-24 RX ADMIN — OXYCODONE HYDROCHLORIDE 5 MG: 5 TABLET ORAL at 13:42

## 2024-08-24 RX ADMIN — PIPERACILLIN AND TAZOBACTAM 3.38 G: 3; .375 INJECTION, POWDER, FOR SOLUTION INTRAVENOUS at 04:33

## 2024-08-24 RX ADMIN — PIPERACILLIN AND TAZOBACTAM 3.38 G: 3; .375 INJECTION, POWDER, FOR SOLUTION INTRAVENOUS at 13:09

## 2024-08-24 SDOH — ECONOMIC STABILITY: TRANSPORTATION INSECURITY
IN THE PAST 12 MONTHS, HAS THE LACK OF TRANSPORTATION KEPT YOU FROM MEDICAL APPOINTMENTS OR FROM GETTING MEDICATIONS?: NO

## 2024-08-24 SDOH — ECONOMIC STABILITY: TRANSPORTATION INSECURITY
IN THE PAST 12 MONTHS, HAS LACK OF RELIABLE TRANSPORTATION KEPT YOU FROM MEDICAL APPOINTMENTS, MEETINGS, WORK OR FROM GETTING THINGS NEEDED FOR DAILY LIVING?: NO

## 2024-08-24 ASSESSMENT — COGNITIVE AND FUNCTIONAL STATUS - GENERAL
SUGGESTED CMS G CODE MODIFIER MOBILITY: CH
DAILY ACTIVITIY SCORE: 24
MOBILITY SCORE: 24
SUGGESTED CMS G CODE MODIFIER DAILY ACTIVITY: CH

## 2024-08-24 ASSESSMENT — LIFESTYLE VARIABLES
TOTAL SCORE: 0
HAVE YOU EVER FELT YOU SHOULD CUT DOWN ON YOUR DRINKING: NO
ON A TYPICAL DAY WHEN YOU DRINK ALCOHOL HOW MANY DRINKS DO YOU HAVE: 1
EVER HAD A DRINK FIRST THING IN THE MORNING TO STEADY YOUR NERVES TO GET RID OF A HANGOVER: NO
HAVE PEOPLE ANNOYED YOU BY CRITICIZING YOUR DRINKING: NO
AVERAGE NUMBER OF DAYS PER WEEK YOU HAVE A DRINK CONTAINING ALCOHOL: 0
CONSUMPTION TOTAL: NEGATIVE
ALCOHOL_USE: YES
EVER FELT BAD OR GUILTY ABOUT YOUR DRINKING: NO
HOW MANY TIMES IN THE PAST YEAR HAVE YOU HAD 5 OR MORE DRINKS IN A DAY: 0

## 2024-08-24 ASSESSMENT — ENCOUNTER SYMPTOMS
ABDOMINAL PAIN: 1
INSOMNIA: 0
DIZZINESS: 0
COUGH: 0
MYALGIAS: 0
HEADACHES: 0
SHORTNESS OF BREATH: 0
WEAKNESS: 0
SENSORY CHANGE: 0
FEVER: 0
CLAUDICATION: 0
NAUSEA: 1
CHILLS: 0
SPEECH CHANGE: 0
DIARRHEA: 0
NERVOUS/ANXIOUS: 0
BLURRED VISION: 0
PHOTOPHOBIA: 0
VOMITING: 0
SORE THROAT: 1
DEPRESSION: 0
CONSTIPATION: 0
HEARTBURN: 0

## 2024-08-24 ASSESSMENT — PAIN DESCRIPTION - PAIN TYPE
TYPE: ACUTE PAIN

## 2024-08-24 ASSESSMENT — SOCIAL DETERMINANTS OF HEALTH (SDOH)
WITHIN THE PAST 12 MONTHS, THE FOOD YOU BOUGHT JUST DIDN'T LAST AND YOU DIDN'T HAVE MONEY TO GET MORE: NEVER TRUE
WITHIN THE LAST YEAR, HAVE YOU BEEN HUMILIATED OR EMOTIONALLY ABUSED IN OTHER WAYS BY YOUR PARTNER OR EX-PARTNER?: NO
WITHIN THE LAST YEAR, HAVE YOU BEEN KICKED, HIT, SLAPPED, OR OTHERWISE PHYSICALLY HURT BY YOUR PARTNER OR EX-PARTNER?: NO
WITHIN THE PAST 12 MONTHS, YOU WORRIED THAT YOUR FOOD WOULD RUN OUT BEFORE YOU GOT THE MONEY TO BUY MORE: NEVER TRUE
WITHIN THE LAST YEAR, HAVE TO BEEN RAPED OR FORCED TO HAVE ANY KIND OF SEXUAL ACTIVITY BY YOUR PARTNER OR EX-PARTNER?: NO
IN THE PAST 12 MONTHS, HAS THE ELECTRIC, GAS, OIL, OR WATER COMPANY THREATENED TO SHUT OFF SERVICE IN YOUR HOME?: NO
WITHIN THE LAST YEAR, HAVE YOU BEEN AFRAID OF YOUR PARTNER OR EX-PARTNER?: NO

## 2024-08-24 ASSESSMENT — FIBROSIS 4 INDEX: FIB4 SCORE: 0.33

## 2024-08-24 NOTE — HOSPITAL COURSE
Patient is a 33-year-old male who came in with abdominal pain 10 out of 10 in severity both right and left lower quadrant with suprapubic pain as well.  Pain is reproducible with percussion.  Imaging shows acute diverticulitis with microperforation.  IV antibiotics initiated and overall patient is feeling slightly better.  He has remained afebrile but WBC count is still markedly elevated at 16.4.  He does complain of sore throat and is requesting throat lozenges as he felt that he either has an upper respiratory infection that he got from his girlfriend versus snoring with his mouth open all night.  Will continue him on IV fluids and I have placed him back on clear liquids as he was on full liquid at time of evaluation.  Surgical consultation appreciated, medical management anticipated at this time.

## 2024-08-24 NOTE — PROGRESS NOTES
"       S: Miky Khanna  is a 33 y.o.  male admitted for acute diverticulitis.  Pain is improving this a.m.      O:  BP 91/52   Pulse 85   Temp 36.9 °C (98.4 °F) (Temporal)   Resp 18   Ht 1.854 m (6' 1\")   Wt (!) 129 kg (284 lb 2.8 oz)   SpO2 95%   Intake/Output                               08/22/24 0700 - 08/23/24 0659 (Not Admitted) 08/23/24 0700 - 08/24/24 0659 08/24/24 0700 - 08/25/24 0659     2164-1627 7358-4392 Total 5604-9290 3718-7011 Total 9203-7310 5956-6225 Total                    Intake    P.O.  --  -- --  480  400 880  --  -- --    P.O. -- -- -- 480 400 880 -- -- --    I.V.  --  -- --  --  199.4 199.4  --  -- --    Volume (mL) (NS infusion) -- -- -- -- 199.4 199.4 -- -- --    IV Piggyback  --  -- --  --  99 99  --  -- --    Volume (mL) (piperacillin-tazobactam (Zosyn) 3.375 g in  mL IVPB) -- -- -- -- 99 99 -- -- --    Total Intake -- -- -- 480 698.4 1178.4 -- -- --       Output    Urine  --  -- --  --  0 0  --  -- --    Number of Times Voided -- -- -- -- 1 x 1 x -- -- --    Urine Void (mL) -- -- -- -- 0 0 -- -- --    Stool  --  -- --  --  -- --  --  -- --    Number of Times Stooled -- -- -- -- 2 x 2 x -- -- --    Total Output -- -- -- -- 0 0 -- -- --       Net I/O     -- -- -- 480 698.4 1178.4 -- -- --          Recent Labs     08/23/24  1240 08/24/24  0029   SODIUM 137 134*   POTASSIUM 4.4 3.8   CHLORIDE 99 99   CO2 24 22   GLUCOSE 100* 100*   BUN 17 15   CREATININE 1.01 1.04   CALCIUM 9.4 8.2*     Recent Labs     08/23/24  1240 08/24/24  0029   WBC 16.5* 16.4*   RBC 5.75 5.05   HEMOGLOBIN 17.0 15.0   HEMATOCRIT 50.5 44.5   MCV 87.8 88.1   MCH 29.6 29.7   MCHC 33.7 33.7   RDW 39.9 40.0   PLATELETCT 301 272   MPV 9.8 10.4       Alert and Oriented x3, No Acute Distress  Normal Respiratory Effort  Abdomen soft, appropriately tender  Extremities warm and well perfused    A/P:  Clear liquid diet not to advance at this time, IV antibiotics.  Plan to transition to oral antibiotics once white count " normal.  Will continue to follow along.    Palmer Schuler MD  [facility]

## 2024-08-24 NOTE — DISCHARGE PLANNING
Met with pt. He reports being independent with ADLs and IADLs. Pt lives with GF, parent, friend. He does not use any DMEs.     Confirmed information on facesheet as correct.   PCP is :Dr. Montano  Pharmacy is :[facility] in [place]  DME:none  Social Support:  Anticipate dc plan and resources needed: GF, parent and friend  Transportation:family can help.       Care Transition Team Assessment    Information Source  Orientation Level: Oriented X4  Information Given By: Patient  Who is responsible for making decisions for patient? : Patient    Readmission Evaluation  Is this a readmission?: No    Elopement Risk  Legal Hold: No    Interdisciplinary Discharge Planning  Does Admitting Nurse Feel This Could be a Complex Discharge?: No  Primary Care Physician: Dr Clarence Montano  Lives with - Patient's Self Care Capacity: Parents, Significant Other, Sibling  Support Systems: Family Member(s), Spouse / Significant Other  Do You Take your Prescribed Medications Regularly: Yes  Able to Return to Previous ADL's: Yes  Mobility Issues: No  Prior Services: Home-Independent  Patient Prefers to be Discharged to:: Home  Assistance Needed: No  Durable Medical Equipment: Not Applicable    Discharge Preparedness  What is your plan after discharge?: Home with help  What are your discharge supports?: Partner, Child, Sibling, Parent  Prior Functional Level: Ambulatory, Drives Self, Independent with Activities of Daily Living, Independent with Medication Management  Difficulity with ADLs: None  Difficulity with IADLs: None    Functional Assesment  Prior Functional Level: Ambulatory, Drives Self, Independent with Activities of Daily Living, Independent with Medication Management    Finances  Financial Barriers to Discharge: No  Prescription Coverage: Yes    Vision / Hearing Impairment  Vision Impairment : No  Hearing Impairment : No    Values / Beliefs / Concerns  Values / Beliefs Concerns : No    Advance Directive  Advance Directive?:  None    Domestic Abuse  Have you ever been the victim of abuse or violence?: No         Discharge Risks or Barriers  Discharge risks or barriers?: No  Patient risk factors: Other (comment)    Anticipated Discharge Information  Discharge Disposition: Discharged to home/self care (01)

## 2024-08-24 NOTE — PROGRESS NOTES
4 Eyes Skin Assessment Completed by THIAGO Motta and THIAGO KAUR.    Head WDL  Ears WDL  Nose WDL  Mouth WDL  Neck WDL  Breast/Chest WDL  Shoulder Blades WDL  Spine WDL  (R) Arm/Elbow/Hand WDL  (L) Arm/Elbow/Hand WDL  Abdomen WDL  Groin WDL  Scrotum/Coccyx/Buttocks WDL  (R) Leg WDL  (L) Leg WDL  (R) Heel/Foot/Toe WDL  (L) Heel/Foot/Toe WDL          Devices In Places Blood Pressure Cuff and Pulse Ox      Interventions In Place Pillows    Possible Skin Injury No    Pictures Uploaded Into Epic N/A  Wound Consult Placed N/A  RN Wound Prevention Protocol Ordered No

## 2024-08-24 NOTE — PROGRESS NOTES
Stool sample collected and sent to lab.   Lab informed that another stool sample is needed to run stool culture at 2202.  Informed pt and states understanding.

## 2024-08-24 NOTE — CARE PLAN
The patient is Stable - Low risk of patient condition declining or worsening    Shift Goals  Clinical Goals: pain management, IV abx/fluids, remain free from falls  Patient Goals: pain management, sleep  Family Goals: ABRIL    Progress made toward(s) clinical / shift goals:    Patient receiving IV abx. Patient calls appropriately, has steady gait with ambulation. Patient has remained free from falls this shift.    Patient is not progressing towards the following goals:

## 2024-08-24 NOTE — PROGRESS NOTES
Bedside report received by day RNGisselle and assumed care of patient. Resting in bed,  endorsing 2/10 pain. A&O x4 on RA. Educated on fall risk, all fall precautions in place. Call light within reach, bed locked and in lowest position, denied other needs at this time. Hourly rounding in progress.

## 2024-08-24 NOTE — PROGRESS NOTES
1815: received report from Fariha SALMON RN  1843: pt arrived to floor, states pain is tolerable, IV fluids continued, bed in lowest position, pt educated to call before getting up, advised we are doing shift change and I will introduce him to the next NOC nurse. Pt verbalized understanding.

## 2024-08-24 NOTE — CARE PLAN
The patient is Stable - Low risk of patient condition declining or worsening    Shift Goals  Clinical Goals: Pain and nausea managament  Patient Goals: painand nausea managment  Family Goals: ABRIL    Progress made toward(s) clinical / shift goals:  ***    Patient is not progressing towards the following goals:

## 2024-08-24 NOTE — PROGRESS NOTES
Hospital Medicine Daily Progress Note    Date of Service  8/24/2024    Chief Complaint  Miky Khanna is a 33 y.o. male admitted 8/23/2024 with abdominal pain.    Hospital Course  Patient is a 33-year-old male who came in with abdominal pain 10 out of 10 in severity both right and left lower quadrant with suprapubic pain as well.  Pain is reproducible with percussion.  Imaging shows acute diverticulitis with microperforation.  IV antibiotics initiated and overall patient is feeling slightly better.  He has remained afebrile but WBC count is still markedly elevated at 16.4.  He does complain of sore throat and is requesting throat lozenges as he felt that he either has an upper respiratory infection that he got from his girlfriend versus snoring with his mouth open all night.  Will continue him on IV fluids and I have placed him back on clear liquids as he was on full liquid at time of evaluation.  Surgical consultation appreciated, medical management anticipated at this time.    Interval Problem Update  8/24 patient states overall he is feeling better with pain medication on board.  He had a full liquid diet ordered and per surgery should only be on clears.  Diet changed.  [place] ordered secondary to sore throat patient is thinking he either has an upper respiratory infection versus had his mouth open with snoring most of the night.  Continue with IV Zosyn and IV fluids.    I have discussed this patient's plan of care and discharge plan at IDT rounds today with Case Management, Nursing, Nursing leadership, and other members of the IDT team.    Consultants/Specialty  general surgery    Code Status  Full Code    Disposition  The patient is not medically cleared for discharge to home or a post-acute facility.  Anticipate discharge to: home with close outpatient follow-up    I have placed the appropriate orders for post-discharge needs.    Review of Systems  Review of Systems   Constitutional:  Negative for chills and  fever.   HENT:  Positive for sore throat. Negative for congestion.    Eyes:  Negative for blurred vision and photophobia.   Respiratory:  Negative for cough and shortness of breath.    Cardiovascular:  Negative for chest pain, claudication and leg swelling.   Gastrointestinal:  Positive for abdominal pain and nausea. Negative for constipation, diarrhea, heartburn and vomiting.   Genitourinary:  Negative for dysuria and hematuria.   Musculoskeletal:  Negative for joint pain and myalgias.   Skin:  Negative for itching and rash.   Neurological:  Negative for dizziness, sensory change, speech change, weakness and headaches.   Psychiatric/Behavioral:  Negative for depression. The patient is not nervous/anxious and does not have insomnia.         Physical Exam  Temp:  [36.1 °C (97 °F)-37.8 °C (100.1 °F)] 36.1 °C (97 °F)  Pulse:  [] 86  Resp:  [18-20] 18  BP: ()/(52-79) 127/79  SpO2:  [90 %-96 %] 91 %    Physical Exam  Vitals and nursing note reviewed.   Constitutional:       General: He is not in acute distress.     Appearance: Normal appearance.   HENT:      Head: Normocephalic and atraumatic.   Eyes:      General: No scleral icterus.     Extraocular Movements: Extraocular movements intact.   Cardiovascular:      Rate and Rhythm: Normal rate and regular rhythm.      Pulses: Normal pulses.      Heart sounds: Normal heart sounds. No murmur heard.  Pulmonary:      Effort: Pulmonary effort is normal. No respiratory distress.      Breath sounds: Normal breath sounds. No wheezing, rhonchi or rales.   Abdominal:      General: Abdomen is flat. Bowel sounds are normal. There is no distension.      Palpations: Abdomen is soft.      Tenderness: There is abdominal tenderness (Global). There is no rebound.   Musculoskeletal:         General: No swelling or tenderness.      Cervical back: Normal range of motion and neck supple.   Lymphadenopathy:      Cervical: No cervical adenopathy.   Skin:     Coloration: Skin is not  jaundiced.      Findings: No erythema.   Neurological:      General: No focal deficit present.      Mental Status: He is alert and oriented to person, place, and time. Mental status is at baseline.      Cranial Nerves: No cranial nerve deficit.   Psychiatric:         Mood and Affect: Mood normal.         Behavior: Behavior normal.         Fluids    Intake/Output Summary (Last 24 hours) at 8/24/2024 1225  Last data filed at 8/24/2024 0900  Gross per 24 hour   Intake 1298.4 ml   Output 0 ml   Net 1298.4 ml       Laboratory  Recent Labs     08/23/24  1240 08/24/24  0029   WBC 16.5* 16.4*   RBC 5.75 5.05   HEMOGLOBIN 17.0 15.0   HEMATOCRIT 50.5 44.5   MCV 87.8 88.1   MCH 29.6 29.7   MCHC 33.7 33.7   RDW 39.9 40.0   PLATELETCT 301 272   MPV 9.8 10.4     Recent Labs     08/23/24  1240 08/24/24  0029   SODIUM 137 134*   POTASSIUM 4.4 3.8   CHLORIDE 99 99   CO2 24 22   GLUCOSE 100* 100*   BUN 17 15   CREATININE 1.01 1.04   CALCIUM 9.4 8.2*                   Imaging  CT-ABDOMEN-PELVIS WITH   Final Result         1. Fatty infiltration of the liver.   2. Diverticulosis with acute sigmoid diverticulitis and suspicion for subtle microperforation.   3. 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           Assessment/Plan  * Acute diverticulitis of intestine- (present on admission)  Assessment & Plan  WBC count 16.4 imaging studies showed acute diverticulitis with microperforation.  Continue IV Zosyn  Continue IV fluid resuscitation  Pain management with IV morphine for severe pain, oxycodone for moderate pain.  Clear liquid diet maintained, patient was on a full liquid diet when I came to bedside but returned back to clear liquids, patient in agreement  Surgical consultation appreciated, medical management at this time    Leukocytosis  Assessment & Plan  White blood cell count 16,400  Continue with Zosyn    Chronic midline low back pain without sciatica- (present on admission)  Assessment & Plan  Pain management  If necessary  obtain PT OT evaluation    Marijuana use- (present on admission)  Assessment & Plan  Chronic marijuana use which we have discussed cessation on.    Obesity (BMI 30-39.9)- (present on admission)  Assessment & Plan  Outpatient weight loss management program lifestyle modification highly recommended         VTE prophylaxis:   SCDs/TEDs      I have performed a physical exam and reviewed and updated ROS and Plan today (8/24/2024). In review of yesterday's note (8/23/2024), there are no changes except as documented above.

## 2024-08-24 NOTE — CONSULTS
CONSULT NOTE  08/23/24  Consulting Physician: Dr. Leon    PATIENT ID  Name:             Miky Khanna   YOB: 1990  Age:                 33 y.o.  male   MRN:               [id number]    CHIEF COMPLAINT:        Acute diverticulitis    HISTORY OF PRESENT ILLNESS:  This is a 33-year-old male with 3-day history of abdominal pain in bilateral lower quadrants.  He presented to the emergency department after his pain increased and did not resolve and was found to have acute diverticulitis with possible microperforation.  On exam he is doing well improved with IV hydration and pain medication.  He is not having like this before.  Endorses subjective fever nausea and vomiting.    REVIEW OF SYSTEMS:   Constitutional: Denies unintended weight change, night sweats, fatigue  Eyes:   Denies eye pain, redness, discharge, vision changes  Ears/Nose/Throat/Mouth: Denies hearing changes, ear pain, nasal congestion, sore throat, dysphagia  Cardiovascular:  Denies Chest pain, shortness of breath, orthopnea, palpitations, claudication  Respiratory:  Denies cough, sputum, wheezing, dyspnea  Gastrointestinal/Hepatic:  Denies dysphagia, melena, jaundice, hematochezia, changing heartburn  Genitourinary:  Denies dysuria, increased frequency, hematuria, urgency  Musculoskeletal/Rheum: Denies changing arthralgias, myalgias, joint swelling, joint stiffness  Skin/Breast: Denies changing skin lesions, pruritis, nipple discharge, hair changes  Neurological: Denies weakness, numbness, paresthesia, syncope, dizziness  Pyschiatric: Denies acute depression, anxiety, insomnia, personality changes, delusions  Endocrine: Denies temperature intolerance, polydipsia, polyuria  Heme/Oncology/Lymph Nodes: Denies easy bruising, bleeding, lymphadenopathy  All other systems were reviewed and are negative                 Past Medical History:   Past Medical History:   Diagnosis Date    ADHD     Anxiety     Chickenpox     Cough      Painful breathing     Shortness of breath     Sputum production     Substance abuse (HCC)     Wheezing        Past Surgical History:  History reviewed. No pertinent surgical history.    Current Outpatient Medications:  No current facility-administered medications on file prior to encounter.     Current Outpatient Medications on File Prior to Encounter   Medication Sig Dispense Refill    cyclobenzaprine (FLEXERIL) 10 mg Tab Take 10 mg by mouth 1 time a day as needed. Indications: Muscle Spasm      methocarbamol (ROBAXIN) 500 MG Tab Take 1,000 mg by mouth 1 time a day as needed. Indications: Musculoskeletal Pain      naproxen (ALEVE) 220 MG tablet Take 220-440 mg by mouth 1 time a day as needed. Indications: Pain         Current Inpatient Medications:   Current Facility-Administered Medications   Medication Last Admin    cyclobenzaprine (Flexeril) tablet 10 mg      methocarbamol (Robaxin) tablet 1,000 mg      NS infusion New Bag at 08/23/24 1634    acetaminophen (Tylenol) tablet 650 mg      Pharmacy Consult Request ...Pain Management Review 1 Each      oxyCODONE immediate-release (Roxicodone) tablet 2.5 mg      Or    oxyCODONE immediate-release (Roxicodone) tablet 5 mg 5 mg at 08/23/24 1701    Or    morphine 4 MG/ML injection 2 mg      senna-docusate (Pericolace Or Senokot S) 8.6-50 MG per tablet 2 Tablet      And    polyethylene glycol/lytes (Miralax) Packet 1 Packet      labetalol (Normodyne/Trandate) injection 10 mg      ondansetron (Zofran) syringe/vial injection 4 mg      ondansetron (Zofran ODT) dispertab 4 mg      promethazine (Phenergan) tablet 12.5-25 mg      promethazine (Phenergan) suppository 12.5-25 mg      prochlorperazine (Compazine) injection 5-10 mg      piperacillin-tazobactam (Zosyn) 3.375 g in  mL IVPB         Medication Allergy/Sensitivities:  Allergies   Allergen Reactions    Tramadol Unspecified     Acid refulx       Family History:  Family History   Problem Relation Age of Onset     "Psychiatric Illness Father         suicide    Cancer Brother         lung cancer, 43    Alcohol/Drug Brother         meth, heroine    Cancer Maternal Grandfather         lung cancer    Hypertension Mother     Diabetes Neg Hx     Heart Disease Neg Hx      Denies family history of bleeding disorders or reactions to anesthesia    Social History:  PCP: Clarence Montano D.O.  Social History     Tobacco Use   Smoking Status Former    Current packs/day: 0.00    Average packs/day: 0.5 packs/day for 12.0 years (6.0 ttl pk-yrs)    Types: Cigarettes    Start date: 2005    Quit date: 2017    Years since quittin.5   Smokeless Tobacco Never     Social History     Substance and Sexual Activity   Alcohol Use Yes    Comment: rare     Social History     Substance and Sexual Activity   Drug Use Yes    Types: Marijuana, Inhaled    Comment: marijuana       PHYSICAL EXAM:  Weight/BMI: Body mass index is 37.49 kg/m².  Vitals:    24 1228 24 1602 24 1701 24 1852   BP: 124/68 133/79 121/76 126/72   Pulse: (!) 102 92 92 98   Resp: 20 18 18 18   Temp: 37.1 °C (98.7 °F) 37.3 °C (99.1 °F) 36.9 °C (98.5 °F) 36.4 °C (97.6 °F)   TempSrc: Temporal Temporal Temporal Temporal   SpO2: 93% 91% 94% 92%   Weight:    (!) 129 kg (284 lb 2.8 oz)   Height:    1.854 m (6' 1\")     Oxygen Therapy:  Pulse Oximetry: 92 %, O2 (LPM): 0, O2 Delivery Device: None - Room Air    Constitutional: Well developed, Well nourished, No acute distress, Non-toxic appearance.    HENMT: Normocephalic, Atraumatic, Bilateral external ears normal, Oropharynx moist mucous membranes, No oral exudates, Nose normal.  No thyromegaly.   Eyes: PERRLA, EOMI, Conjunctiva normal, No discharge.   Neck: Normal range of motion, No cervical tenderness, Supple, No stridor, no JVD.  Cardiovascular: Normal heart rate, Normal rhythm, No murmurs, No rubs, No gallops.   Extremites with intact distal pulses, no cyanosis, clubbing or edema.   Lungs:  Respiratory effort " is normal. Normal breath sounds, breath sounds clear to auscultation bilaterally,  no rales, no rhonchi, no wheezing.   Abdomen: Bowel sounds normal, Soft, No tenderness, No guarding, No rebound, No masses, No hepatosplenomegaly.    Skin: Warm, Dry, No erythema, No rash, no induration or crepitus.        Neurologic: Alert & oriented x 3, Normal motor function, Normal sensory function, No focal deficits noted, cranial nerves II through XII are normal.  Psychiatric: Affect normal, Judgment normal, Mood normal.     LAB DATA REVIEWED:  Recent Results (from the past 24 hour(s))   POCT Urinalysis    Collection Time: 08/23/24 11:40 AM   Result Value Ref Range    POC Color DARK YELLOW Negative    POC Appearance CLEAR Negative    POC Glucose NEGATIVE Negative mg/dL    POC Bilirubin NEGATIVE Negative mg/dL    POC Ketones NEGATIVE Negative mg/dL    POC Specific Gravity 1.025 <1.005 - >1.030    POC Blood NEGATIVE Negative    POC Urine PH 7.5 5.0 - 8.0    POC Protein 30 mg/dL Negative mg/dL    POC Urobiligen 0.2 E.U./dL Negative (0.2) mg/dL    POC Nitrites NEGATIVE Negative    POC Leukocyte Esterase NEGATIVE Negative   CBC WITH DIFFERENTIAL    Collection Time: 08/23/24 12:40 PM   Result Value Ref Range    WBC 16.5 (H) 4.8 - 10.8 K/uL    RBC 5.75 4.70 - 6.10 M/uL    Hemoglobin 17.0 14.0 - 18.0 g/dL    Hematocrit 50.5 42.0 - 52.0 %    MCV 87.8 81.4 - 97.8 fL    MCH 29.6 27.0 - 33.0 pg    MCHC 33.7 32.3 - 36.5 g/dL    RDW 39.9 35.9 - 50.0 fL    Platelet Count 301 164 - 446 K/uL    MPV 9.8 9.0 - 12.9 fL    Neutrophils-Polys 81.10 (H) 44.00 - 72.00 %    Lymphocytes 9.60 (L) 22.00 - 41.00 %    Monocytes 8.20 0.00 - 13.40 %    Eosinophils 0.30 0.00 - 6.90 %    Basophils 0.30 0.00 - 1.80 %    Immature Granulocytes 0.50 0.00 - 0.90 %    Nucleated RBC 0.00 0.00 - 0.20 /100 WBC    Neutrophils (Absolute) 13.41 (H) 1.82 - 7.42 K/uL    Lymphs (Absolute) 1.58 1.00 - 4.80 K/uL    Monos (Absolute) 1.35 (H) 0.00 - 0.85 K/uL    Eos (Absolute) 0.05  0.00 - 0.51 K/uL    Baso (Absolute) 0.05 0.00 - 0.12 K/uL    Immature Granulocytes (abs) 0.08 0.00 - 0.11 K/uL    NRBC (Absolute) 0.00 K/uL   COMP METABOLIC PANEL    Collection Time: 08/23/24 12:40 PM   Result Value Ref Range    Sodium 137 135 - 145 mmol/L    Potassium 4.4 3.6 - 5.5 mmol/L    Chloride 99 96 - 112 mmol/L    Co2 24 20 - 33 mmol/L    Anion Gap 14.0 7.0 - 16.0    Glucose 100 (H) 65 - 99 mg/dL    Bun 17 8 - 22 mg/dL    Creatinine 1.01 0.50 - 1.40 mg/dL    Calcium 9.4 8.4 - 10.2 mg/dL    Correct Calcium 9.1 8.5 - 10.5 mg/dL    AST(SGOT) 19 12 - 45 U/L    ALT(SGPT) 50 2 - 50 U/L    Alkaline Phosphatase 75 30 - 99 U/L    Total Bilirubin 1.1 0.1 - 1.5 mg/dL    Albumin 4.4 3.2 - 4.9 g/dL    Total Protein 7.5 6.0 - 8.2 g/dL    Globulin 3.1 1.9 - 3.5 g/dL    A-G Ratio 1.4 g/dL   LIPASE    Collection Time: 08/23/24 12:40 PM   Result Value Ref Range    Lipase 16 11 - 82 U/L   ESTIMATED GFR    Collection Time: 08/23/24 12:40 PM   Result Value Ref Range    GFR (CKD-EPI) 100 >60 mL/min/1.73 m 2   PROCALCITONIN    Collection Time: 08/23/24 12:40 PM   Result Value Ref Range    Procalcitonin 0.08 <0.25 ng/mL   URINALYSIS    Collection Time: 08/23/24  2:40 PM    Specimen: Urine   Result Value Ref Range    Color Yellow     Character Clear     Specific Gravity 1.010 <1.035    Ph 6.5 5.0 - 8.0    Glucose Negative Negative mg/dL    Ketones Negative Negative mg/dL    Protein Negative Negative mg/dL    Bilirubin Negative Negative    Nitrite Negative Negative    Leukocyte Esterase Negative Negative    Occult Blood Negative Negative    Micro Urine Req see below    LACTIC ACID    Collection Time: 08/23/24  4:17 PM   Result Value Ref Range    Lactic Acid 0.8 0.5 - 2.0 mmol/L       IMAGING:   Images Independently Reviewed   CT-ABDOMEN-PELVIS WITH   Final Result         1. Fatty infiltration of the liver.   2. Diverticulosis with acute sigmoid diverticulitis and suspicion for subtle microperforation.   3. Pars defects are noted  bilaterally at L5.          ASSESSMENT/PLAN     Patient with acute diverticulitis recommend IV antibiotics.  Can start clear liquids in the a.m.  Not to advance beyond.  Plan to transition to oral antibiotics once white count has normalized.  We will continue to follow along.    Palmer Schuler MD  [facility]

## 2024-08-25 LAB
ALBUMIN SERPL BCP-MCNC: 3.5 G/DL (ref 3.2–4.9)
ALBUMIN/GLOB SERPL: 1.3 G/DL
ALP SERPL-CCNC: 67 U/L (ref 30–99)
ALT SERPL-CCNC: 26 U/L (ref 2–50)
ANION GAP SERPL CALC-SCNC: 7 MMOL/L (ref 7–16)
AST SERPL-CCNC: 12 U/L (ref 12–45)
BILIRUB SERPL-MCNC: 0.8 MG/DL (ref 0.1–1.5)
BUN SERPL-MCNC: 9 MG/DL (ref 8–22)
CALCIUM ALBUM COR SERPL-MCNC: 8.9 MG/DL (ref 8.5–10.5)
CALCIUM SERPL-MCNC: 8.5 MG/DL (ref 8.4–10.2)
CHLORIDE SERPL-SCNC: 103 MMOL/L (ref 96–112)
CO2 SERPL-SCNC: 24 MMOL/L (ref 20–33)
CREAT SERPL-MCNC: 1.04 MG/DL (ref 0.5–1.4)
E COLI SXT1+2 STL IA: NORMAL
ERYTHROCYTE [DISTWIDTH] IN BLOOD BY AUTOMATED COUNT: 39.6 FL (ref 35.9–50)
GFR SERPLBLD CREATININE-BSD FMLA CKD-EPI: 97 ML/MIN/1.73 M 2
GLOBULIN SER CALC-MCNC: 2.8 G/DL (ref 1.9–3.5)
GLUCOSE SERPL-MCNC: 87 MG/DL (ref 65–99)
HCT VFR BLD AUTO: 42.4 % (ref 42–52)
HGB BLD-MCNC: 14.1 G/DL (ref 14–18)
MCH RBC QN AUTO: 29.6 PG (ref 27–33)
MCHC RBC AUTO-ENTMCNC: 33.3 G/DL (ref 32.3–36.5)
MCV RBC AUTO: 88.9 FL (ref 81.4–97.8)
PLATELET # BLD AUTO: 242 K/UL (ref 164–446)
PMV BLD AUTO: 10.1 FL (ref 9–12.9)
POTASSIUM SERPL-SCNC: 3.9 MMOL/L (ref 3.6–5.5)
PROT SERPL-MCNC: 6.3 G/DL (ref 6–8.2)
RBC # BLD AUTO: 4.77 M/UL (ref 4.7–6.1)
SIGNIFICANT IND 70042: NORMAL
SITE SITE: NORMAL
SODIUM SERPL-SCNC: 134 MMOL/L (ref 135–145)
SOURCE SOURCE: NORMAL
WBC # BLD AUTO: 9.7 K/UL (ref 4.8–10.8)

## 2024-08-25 PROCEDURE — A9270 NON-COVERED ITEM OR SERVICE: HCPCS | Performed by: INTERNAL MEDICINE

## 2024-08-25 PROCEDURE — 700105 HCHG RX REV CODE 258: Performed by: HOSPITALIST

## 2024-08-25 PROCEDURE — 700102 HCHG RX REV CODE 250 W/ 637 OVERRIDE(OP): Performed by: HOSPITALIST

## 2024-08-25 PROCEDURE — 36415 COLL VENOUS BLD VENIPUNCTURE: CPT

## 2024-08-25 PROCEDURE — 99232 SBSQ HOSP IP/OBS MODERATE 35: CPT | Performed by: INTERNAL MEDICINE

## 2024-08-25 PROCEDURE — A9270 NON-COVERED ITEM OR SERVICE: HCPCS | Performed by: HOSPITALIST

## 2024-08-25 PROCEDURE — 700102 HCHG RX REV CODE 250 W/ 637 OVERRIDE(OP): Performed by: INTERNAL MEDICINE

## 2024-08-25 PROCEDURE — 700111 HCHG RX REV CODE 636 W/ 250 OVERRIDE (IP): Mod: JZ | Performed by: HOSPITALIST

## 2024-08-25 PROCEDURE — 85027 COMPLETE CBC AUTOMATED: CPT

## 2024-08-25 PROCEDURE — 94760 N-INVAS EAR/PLS OXIMETRY 1: CPT

## 2024-08-25 PROCEDURE — 770001 HCHG ROOM/CARE - MED/SURG/GYN PRIV*

## 2024-08-25 PROCEDURE — 80053 COMPREHEN METABOLIC PANEL: CPT

## 2024-08-25 RX ADMIN — CYCLOBENZAPRINE 10 MG: 10 TABLET, FILM COATED ORAL at 23:48

## 2024-08-25 RX ADMIN — OXYCODONE HYDROCHLORIDE 5 MG: 5 TABLET ORAL at 03:01

## 2024-08-25 RX ADMIN — MORPHINE SULFATE 2 MG: 4 INJECTION, SOLUTION INTRAMUSCULAR; INTRAVENOUS at 16:43

## 2024-08-25 RX ADMIN — OXYCODONE HYDROCHLORIDE 5 MG: 5 TABLET ORAL at 06:15

## 2024-08-25 RX ADMIN — AMOXICILLIN AND CLAVULANATE POTASSIUM 1 TABLET: 875; 125 TABLET, FILM COATED ORAL at 17:17

## 2024-08-25 RX ADMIN — SODIUM CHLORIDE: 9 INJECTION, SOLUTION INTRAVENOUS at 20:52

## 2024-08-25 RX ADMIN — MORPHINE SULFATE 2 MG: 4 INJECTION, SOLUTION INTRAMUSCULAR; INTRAVENOUS at 23:47

## 2024-08-25 RX ADMIN — ONDANSETRON 4 MG: 2 INJECTION INTRAMUSCULAR; INTRAVENOUS at 07:50

## 2024-08-25 RX ADMIN — MORPHINE SULFATE 2 MG: 4 INJECTION, SOLUTION INTRAMUSCULAR; INTRAVENOUS at 07:50

## 2024-08-25 RX ADMIN — SODIUM CHLORIDE: 9 INJECTION, SOLUTION INTRAVENOUS at 11:15

## 2024-08-25 RX ADMIN — OXYCODONE HYDROCHLORIDE 5 MG: 5 TABLET ORAL at 15:20

## 2024-08-25 RX ADMIN — PIPERACILLIN AND TAZOBACTAM 3.38 G: 3; .375 INJECTION, POWDER, FOR SOLUTION INTRAVENOUS at 04:44

## 2024-08-25 RX ADMIN — OXYCODONE HYDROCHLORIDE 5 MG: 5 TABLET ORAL at 20:47

## 2024-08-25 ASSESSMENT — ENCOUNTER SYMPTOMS
FEVER: 0
SORE THROAT: 0
CLAUDICATION: 0
VOMITING: 0
SPEECH CHANGE: 0
COUGH: 0
NAUSEA: 0
CHILLS: 0
SENSORY CHANGE: 0
HEADACHES: 0
INSOMNIA: 0
ABDOMINAL PAIN: 1
SHORTNESS OF BREATH: 0
DIARRHEA: 0
DIZZINESS: 0
HEARTBURN: 0
WEAKNESS: 0
CONSTIPATION: 0
DEPRESSION: 0
PHOTOPHOBIA: 0
BLURRED VISION: 0
MYALGIAS: 0
NERVOUS/ANXIOUS: 0

## 2024-08-25 ASSESSMENT — PAIN DESCRIPTION - PAIN TYPE
TYPE: ACUTE PAIN

## 2024-08-25 NOTE — PROGRESS NOTES
Bedside report received by day RNOpal and assumed care of patient. Resting in bed, denied pain. A&O x4 on RA with significant other at BS. Educated on fall risk, all fall precautions in place. Call light within reach, bed locked and in lowest position, denied other needs at this time. Hourly rounding in process.

## 2024-08-25 NOTE — PROGRESS NOTES
Hospital Medicine Daily Progress Note    Date of Service  8/25/2024    Chief Complaint  Miky Khanna is a 33 y.o. male admitted 8/23/2024 with abdominal pain.    Hospital Course  Patient is a 33-year-old male who came in with abdominal pain 10 out of 10 in severity both right and left lower quadrant with suprapubic pain as well.  Pain is reproducible with percussion.  Imaging shows acute diverticulitis with microperforation.  IV antibiotics initiated and overall patient is feeling slightly better.  He has remained afebrile but WBC count is still markedly elevated at 16.4.  He does complain of sore throat and is requesting throat lozenges as he felt that he either has an upper respiratory infection that he got from his girlfriend versus snoring with his mouth open all night.  Will continue him on IV fluids and I have placed him back on clear liquids as he was on full liquid at time of evaluation.  Surgical consultation appreciated, medical management anticipated at this time.    Interval Problem Update  8/24 patient states overall he is feeling better with pain medication on board.  He had a full liquid diet ordered and per surgery should only be on clears.  Diet changed.  [place] ordered secondary to sore throat patient is thinking he either has an upper respiratory infection versus had his mouth open with snoring most of the night.  Continue with IV Zosyn and IV fluids.  8/25 patient is definitely showing improvement today.  WBC count to normal from 16-9.7.  Will transition from IV Zosyn to p.o. Augmentin.  Fecal lactoferrin is positive which is expected.  Discussed with general surgery and okay to advance diet to full liquid and transition from IV to oral antibiotics.  Anticipate discharge home likely tomorrow.  Many questions answered about nutrition and recommended dietary changes.    I have discussed this patient's plan of care and discharge plan at IDT rounds today with Case Management, Nursing, Nursing  leadership, and other members of the IDT team.    Consultants/Specialty  general surgery    Code Status  Full Code    Disposition  The patient is not medically cleared for discharge to home or a post-acute facility.  Anticipate discharge to: home with close outpatient follow-up    I have placed the appropriate orders for post-discharge needs.    Review of Systems  Review of Systems   Constitutional:  Negative for chills and fever.   HENT:  Negative for congestion and sore throat.    Eyes:  Negative for blurred vision and photophobia.   Respiratory:  Negative for cough and shortness of breath.    Cardiovascular:  Negative for chest pain, claudication and leg swelling.   Gastrointestinal:  Positive for abdominal pain (better). Negative for constipation, diarrhea, heartburn, nausea and vomiting.   Genitourinary:  Negative for dysuria and hematuria.   Musculoskeletal:  Negative for joint pain and myalgias.   Skin:  Negative for itching and rash.   Neurological:  Negative for dizziness, sensory change, speech change, weakness and headaches.   Psychiatric/Behavioral:  Negative for depression. The patient is not nervous/anxious and does not have insomnia.         Physical Exam  Temp:  [35.9 °C (96.7 °F)-37.1 °C (98.8 °F)] 35.9 °C (96.7 °F)  Pulse:  [54-75] 54  Resp:  [17-18] 17  BP: ()/(55-77) 118/73  SpO2:  [90 %-99 %] 96 %    Physical Exam  Vitals and nursing note reviewed.   Constitutional:       General: He is not in acute distress.     Appearance: Normal appearance.   HENT:      Head: Normocephalic and atraumatic.   Eyes:      General: No scleral icterus.     Extraocular Movements: Extraocular movements intact.   Cardiovascular:      Rate and Rhythm: Normal rate and regular rhythm.      Pulses: Normal pulses.      Heart sounds: Normal heart sounds. No murmur heard.  Pulmonary:      Effort: Pulmonary effort is normal. No respiratory distress.      Breath sounds: Normal breath sounds. No wheezing, rhonchi or  rales.   Abdominal:      General: Abdomen is flat. Bowel sounds are normal. There is no distension.      Palpations: Abdomen is soft.      Tenderness: There is abdominal tenderness (Global - less tender today). There is no rebound.   Musculoskeletal:         General: No swelling or tenderness.      Cervical back: Normal range of motion and neck supple.   Lymphadenopathy:      Cervical: No cervical adenopathy.   Skin:     Coloration: Skin is not jaundiced.      Findings: No erythema.   Neurological:      General: No focal deficit present.      Mental Status: He is alert and oriented to person, place, and time. Mental status is at baseline.      Cranial Nerves: No cranial nerve deficit.   Psychiatric:         Mood and Affect: Mood normal.         Behavior: Behavior normal.         Fluids    Intake/Output Summary (Last 24 hours) at 8/25/2024 1234  Last data filed at 8/25/2024 0622  Gross per 24 hour   Intake 3481.05 ml   Output 0 ml   Net 3481.05 ml       Laboratory  Recent Labs     08/23/24  1240 08/24/24  0029 08/25/24  0319   WBC 16.5* 16.4* 9.7   RBC 5.75 5.05 4.77   HEMOGLOBIN 17.0 15.0 14.1   HEMATOCRIT 50.5 44.5 42.4   MCV 87.8 88.1 88.9   MCH 29.6 29.7 29.6   MCHC 33.7 33.7 33.3   RDW 39.9 40.0 39.6   PLATELETCT 301 272 242   MPV 9.8 10.4 10.1     Recent Labs     08/23/24  1240 08/24/24  0029 08/25/24  0319   SODIUM 137 134* 134*   POTASSIUM 4.4 3.8 3.9   CHLORIDE 99 99 103   CO2 24 22 24   GLUCOSE 100* 100* 87   BUN 17 15 9   CREATININE 1.01 1.04 1.04   CALCIUM 9.4 8.2* 8.5                   Imaging  CT-ABDOMEN-PELVIS WITH   Final Result         1. Fatty infiltration of the liver.   2. Diverticulosis with acute sigmoid diverticulitis and suspicion for subtle microperforation.   3. 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           Assessment/Plan  * Acute diverticulitis of intestine- (present on admission)  Assessment & Plan  WBC count 9.7   imaging studies showed acute diverticulitis with  microperforation.  Transition to oral antibiotic with Augmentin  Continue IV fluid resuscitation  Pain management with IV morphine for severe pain, oxycodone for moderate pain.  Advance diet to full liquid, cleared with general surgery  Surgical consultation appreciated, medical management at this time    Leukocytosis  Assessment & Plan  White blood cell count now normal at 9.7  Transition from Zosyn to Augmentin    Chronic midline low back pain without sciatica- (present on admission)  Assessment & Plan  Pain management  If necessary obtain PT OT evaluation    Marijuana use- (present on admission)  Assessment & Plan  Chronic marijuana use which we have discussed cessation on.    Obesity (BMI 30-39.9)- (present on admission)  Assessment & Plan  Outpatient weight loss management program lifestyle modification highly recommended         VTE prophylaxis:   SCDs/TEDs      I have performed a physical exam and reviewed and updated ROS and Plan today (8/25/2024). In review of yesterday's note (8/24/2024), there are no changes except as documented above.

## 2024-08-25 NOTE — CARE PLAN
The patient is Stable - Low risk of patient condition declining or worsening    Shift Goals  Clinical Goals: Pain management, IV abx, remain free from falls  Patient Goals: pain management, sleep  Family Goals: ABRIL    Progress made toward(s) clinical / shift goals:      Pain managed by medication per MAR, rest, repositioning and sleep. Patient receiving Zosyn q8. Patient ambulatory with standby, steady gait. Remains free from falls this shift.    Patient is not progressing towards the following goals:

## 2024-08-25 NOTE — PROGRESS NOTES
"       S: Miky Khanna  is a 33 y.o.  male admitted for acute diverticulitis.  Pain is improving this a.m.      O:  /73   Pulse (!) 54   Temp 35.9 °C (96.7 °F) (Temporal)   Resp 17   Ht 1.854 m (6' 1\")   Wt (!) 129 kg (284 lb 2.8 oz)   SpO2 96%   Intake/Output                               08/23/24 0700 - 08/24/24 0659 08/24/24 0700 - 08/25/24 0659 08/25/24 0700 - 08/26/24 0659     4734-2018 4175-0730 Total 8463-5459 7995-4941 Total 2165-6753 8342-3542 Total                    Intake    P.O.  480  400 880  240  400 640  --  -- --    P.O. 480 400 880 240 400 640 -- -- --    I.V.  --  199.4 199.4  --  2757.2 2757.2  --  -- --    Volume (mL) (NS infusion) -- 199.4 199.4 -- 2757.2 2757.2 -- -- --    IV Piggyback  --  99 99  --  203.9 203.9  --  -- --    Volume (mL) (piperacillin-tazobactam (Zosyn) 3.375 g in  mL IVPB) -- 99 99 -- 203.9 203.9 -- -- --    Total Intake 480 698.4 1178.4 240 3361.1 3601.1 -- -- --       Output    Urine  --  0 0  --  0 0  --  -- --    Number of Times Voided -- 1 x 1 x 2 x 1 x 3 x 1 x -- 1 x    Urine Void (mL) -- 0 0 -- 0 0 -- -- --    Stool  --  -- --  --  -- --  --  -- --    Number of Times Stooled -- 2 x 2 x 1 x 0 x 1 x 1 x -- 1 x    Total Output -- 0 0 -- 0 0 -- -- --       Net I/O     480 698.4 1178.4 240 3361.1 3601.1 -- -- --          Recent Labs     08/23/24  1240 08/24/24  0029 08/25/24 0319   SODIUM 137 134* 134*   POTASSIUM 4.4 3.8 3.9   CHLORIDE 99 99 103   CO2 24 22 24   GLUCOSE 100* 100* 87   BUN 17 15 9   CREATININE 1.01 1.04 1.04   CALCIUM 9.4 8.2* 8.5     Recent Labs     08/23/24  1240 08/24/24  0029 08/25/24 0319   WBC 16.5* 16.4* 9.7   RBC 5.75 5.05 4.77   HEMOGLOBIN 17.0 15.0 14.1   HEMATOCRIT 50.5 44.5 42.4   MCV 87.8 88.1 88.9   MCH 29.6 29.7 29.6   MCHC 33.7 33.7 33.3   RDW 39.9 40.0 39.6   PLATELETCT 301 272 242   MPV 9.8 10.4 10.1       Alert and Oriented x3, No Acute Distress  Normal Respiratory Effort  Abdomen soft, appropriately " tender  Extremities warm and well perfused    A/P:  Full liquid diet, transition to oral antibiotics.  Can follow-up in our outpatient diverticulitis protocol pathway. Will continue to follow along.    Palmer Schuler MD  [facility]

## 2024-08-25 NOTE — PROGRESS NOTES
Bedside report received from night shift RN. Assumed care of patient. Daily plan of care discussed. Pt sleeping comfortably in bed with no signs of distress noted. Breathing even and unlabored.Call light within reach. Bed locked in lowest position. Plan of care on going.

## 2024-08-25 NOTE — CARE PLAN
The patient is Stable - Low risk of patient condition declining or worsening    Shift Goals  Clinical Goals: Pain management, tolerate diet  Patient Goals: Pain management, rest  Family Goals: ABRIL    Progress made toward(s) clinical / shift goals:  Pts pain was managed with PRN medication, pt tolerated diet.     Patient is not progressing towards the following goals:      Problem: Pain - Standard  Goal: Alleviation of pain or a reduction in pain to the patient’s comfort goal  Outcome: Not Met

## 2024-08-26 ENCOUNTER — PHARMACY VISIT (OUTPATIENT)
Dept: PHARMACY | Facility: MEDICAL CENTER | Age: 34
End: 2024-08-26
Payer: COMMERCIAL

## 2024-08-26 VITALS
RESPIRATION RATE: 18 BRPM | DIASTOLIC BLOOD PRESSURE: 82 MMHG | OXYGEN SATURATION: 96 % | HEART RATE: 83 BPM | BODY MASS INDEX: 37.66 KG/M2 | SYSTOLIC BLOOD PRESSURE: 139 MMHG | WEIGHT: 284.17 LBS | TEMPERATURE: 97.4 F | HEIGHT: 73 IN

## 2024-08-26 LAB
ANION GAP SERPL CALC-SCNC: 13 MMOL/L (ref 7–16)
BUN SERPL-MCNC: 8 MG/DL (ref 8–22)
CALCIUM SERPL-MCNC: 8.9 MG/DL (ref 8.4–10.2)
CHLORIDE SERPL-SCNC: 104 MMOL/L (ref 96–112)
CO2 SERPL-SCNC: 22 MMOL/L (ref 20–33)
CREAT SERPL-MCNC: 0.9 MG/DL (ref 0.5–1.4)
ERYTHROCYTE [DISTWIDTH] IN BLOOD BY AUTOMATED COUNT: 38.5 FL (ref 35.9–50)
GFR SERPLBLD CREATININE-BSD FMLA CKD-EPI: 115 ML/MIN/1.73 M 2
GLUCOSE SERPL-MCNC: 85 MG/DL (ref 65–99)
HCT VFR BLD AUTO: 42.5 % (ref 42–52)
HGB BLD-MCNC: 14.4 G/DL (ref 14–18)
MCH RBC QN AUTO: 29.4 PG (ref 27–33)
MCHC RBC AUTO-ENTMCNC: 33.9 G/DL (ref 32.3–36.5)
MCV RBC AUTO: 86.9 FL (ref 81.4–97.8)
PLATELET # BLD AUTO: 275 K/UL (ref 164–446)
PMV BLD AUTO: 10.1 FL (ref 9–12.9)
POTASSIUM SERPL-SCNC: 4.3 MMOL/L (ref 3.6–5.5)
RBC # BLD AUTO: 4.89 M/UL (ref 4.7–6.1)
SODIUM SERPL-SCNC: 139 MMOL/L (ref 135–145)
WBC # BLD AUTO: 8 K/UL (ref 4.8–10.8)

## 2024-08-26 PROCEDURE — A9270 NON-COVERED ITEM OR SERVICE: HCPCS | Performed by: HOSPITALIST

## 2024-08-26 PROCEDURE — 700102 HCHG RX REV CODE 250 W/ 637 OVERRIDE(OP): Performed by: INTERNAL MEDICINE

## 2024-08-26 PROCEDURE — A9270 NON-COVERED ITEM OR SERVICE: HCPCS | Performed by: INTERNAL MEDICINE

## 2024-08-26 PROCEDURE — 700105 HCHG RX REV CODE 258: Performed by: HOSPITALIST

## 2024-08-26 PROCEDURE — 700102 HCHG RX REV CODE 250 W/ 637 OVERRIDE(OP): Performed by: HOSPITALIST

## 2024-08-26 PROCEDURE — 99239 HOSP IP/OBS DSCHRG MGMT >30: CPT | Performed by: INTERNAL MEDICINE

## 2024-08-26 PROCEDURE — 80048 BASIC METABOLIC PNL TOTAL CA: CPT

## 2024-08-26 PROCEDURE — 85027 COMPLETE CBC AUTOMATED: CPT

## 2024-08-26 PROCEDURE — 36415 COLL VENOUS BLD VENIPUNCTURE: CPT

## 2024-08-26 PROCEDURE — RXMED WILLOW AMBULATORY MEDICATION CHARGE: Performed by: INTERNAL MEDICINE

## 2024-08-26 RX ORDER — OXYCODONE HYDROCHLORIDE 5 MG/1
2.5-5 TABLET ORAL EVERY 6 HOURS PRN
Qty: 20 TABLET | Refills: 0 | Status: SHIPPED | OUTPATIENT
Start: 2024-08-26 | End: 2024-08-31

## 2024-08-26 RX ADMIN — AMOXICILLIN AND CLAVULANATE POTASSIUM 1 TABLET: 875; 125 TABLET, FILM COATED ORAL at 05:18

## 2024-08-26 RX ADMIN — SODIUM CHLORIDE: 9 INJECTION, SOLUTION INTRAVENOUS at 05:28

## 2024-08-26 RX ADMIN — OXYCODONE HYDROCHLORIDE 5 MG: 5 TABLET ORAL at 05:27

## 2024-08-26 ASSESSMENT — PAIN DESCRIPTION - PAIN TYPE
TYPE: ACUTE PAIN

## 2024-08-26 NOTE — PROGRESS NOTES
Assumed care of patient at 1900, bedside report received from THIAGO Joseph.  Patient sitting up in bed playing video game, alert & oriented, calm & cooperative, NAD, breaths even and unlabored, VS stable on room air, IV fluids infusing per MAR. Bed in low & locked position, call light and personal belongings within reach, fall precautions in place. Patient updated on POC, verbalized understanding of full liquids diet order and ambulation expectations. Reports tolerable pain, denies further needs at this time. Hourly rounding continues.

## 2024-08-26 NOTE — PROGRESS NOTES
Received bedside report from night shift RN.  Assumed pt care.   Assessment and chart check complete.  Pt is AA0X4, respirations even and unlabored, no signs of distress, denies nausea/vomiting.  Updated for the POC of the day.  Fall precautions in place, treaded socks on pt, bed in low position.   Call light within reach.   Educated pt to call if needing anything.   Hourly rounding.

## 2024-08-26 NOTE — CARE PLAN
The patient is Stable - Low risk of patient condition declining or worsening    Shift Goals  Clinical Goals: tolerate diet, manage pain at tolerable level throughout the shift  Patient Goals: pain control,  for discharge  Family Goals: N/A    Progress made toward(s) clinical / shift goals:  Tolerating GI soft diet, denies any nausea/vomiting. Pian has been controlled throughout the shift.    Patient is not progressing towards the following goals:      Problem: Pain - Standard  Goal: Alleviation of pain or a reduction in pain to the patient’s comfort goal  Outcome: Progressing     Problem: Discharge Barriers/Planning  Goal: Patient's continuum of care needs are met  Outcome: Progressing  Flowsheets (Taken 8/26/2024 1101)  Continuum of Care Needs:   Assessed for discharge barriers   Communicated discharge barriers to interdisciplinary tream   Involved patient/family/support system in discharge process   Provided and explained discharge instructions     Problem: Gastrointestinal Irritability  Goal: Nausea and vomiting will be absent or improve  Outcome: Progressing

## 2024-08-26 NOTE — CARE PLAN
The patient is Stable - Low risk of patient condition declining or worsening    Shift Goals  Clinical Goals: Tolerate diet, pain management, IV fluids, remain free from falls.  Patient Goals: Pain control, tolerate oral intake, rest/sleep.  Family Goals: N/A    Progress made toward(s) clinical / shift goals:  Patient tolerated diet and IV fluids without issue. Patient reported tolerable pain levels with oxycodone and morphine as ordered per MAR. Patient had no falls or injury.    Patient is not progressing towards the following goals:

## 2024-08-26 NOTE — DISCHARGE SUMMARY
Discharge Summary    CHIEF COMPLAINT ON ADMISSION  Chief Complaint   Patient presents with    Abdominal Pain       Reason for Admission  Abdominal Pain, Sent By Urgent Care     Admission Date  8/23/2024    CODE STATUS  Full Code    HPI & HOSPITAL COURSE  Patient is a 33-year-old male who came in with abdominal pain 10 out of 10 in severity both right and left lower quadrant with suprapubic pain as well.  Pain is reproducible with percussion.  Imaging shows acute diverticulitis with microperforation.  IV antibiotics initiated and overall patient is feeling slightly better.  He has remained afebrile but WBC count is still markedly elevated at 16.4.  Surgical consultation appreciated, medical management with bowel rest and antibiotics recommended.  Patient continued to show improvement and transition from IV antibiotics to oral antibiotics with continued normal WBC count.  He has been started on a GI soft diet and told to stay on this diet for the next 7 days before introducing more fiber into his diet.  He will be on 7 more days of Augmentin and pain medication is also been prescribed to him.  If he should have fevers chills or worsening abdominal pain, he has been instructed to return back to the emergency room.  Patient appropriate for discharge at this time.    Therefore, he is discharged in good and stable condition to home with close outpatient follow-up.    The patient met 2-midnight criteria for an inpatient stay at the time of discharge.    Discharge Date  8/26/2024    FOLLOW UP ITEMS POST DISCHARGE  PCP-2 weeks    DISCHARGE DIAGNOSES  Principal Problem:    Acute diverticulitis of intestine (POA: Yes)  Active Problems:    Obesity (BMI 30-39.9) (POA: Yes)    Marijuana use (POA: Yes)    Chronic midline low back pain without sciatica (POA: Yes)    Leukocytosis (POA: Unknown)  Resolved Problems:    * No resolved hospital problems. *      FOLLOW UP  No future appointments.  RAMESH [address]  [facility] [address]  [place] [address]  [phone]    Follow up        MEDICATIONS ON DISCHARGE     Medication List        START taking these medications        Instructions   amoxicillin-clavulanate 875-125 MG Tabs  Commonly known as: Augmentin   Take 1 Tablet by mouth every 12 hours for 7 days.  Dose: 1 Tablet     oxyCODONE immediate-release 5 MG Tabs  Commonly known as: Roxicodone   Take 0.5-1 Tablets by mouth every 6 hours as needed for Severe Pain for up to 5 days.  Dose: 2.5-5 mg            CONTINUE taking these medications        Instructions   Aleve 220 MG tablet  Generic drug: naproxen   Take 220-440 mg by mouth 1 time a day as needed. Indications: Pain  Dose: 220-440 mg     cyclobenzaprine 10 mg Tabs  Commonly known as: Flexeril   Take 10 mg by mouth 1 time a day as needed. Indications: Muscle Spasm  Dose: 10 mg     methocarbamol 500 MG Tabs  Commonly known as: Robaxin   Take 1,000 mg by mouth 1 time a day as needed. Indications: Musculoskeletal Pain  Dose: 1,000 mg              Allergies  Allergies   Allergen Reactions    Tramadol Unspecified     Acid refulx       DIET  Orders Placed This Encounter   Procedures    Diet Order Diet: Low Fiber(GI Soft)     Standing Status:   Standing     Number of Occurrences:   1     Order Specific Question:   Diet:     Answer:   Low Fiber(GI Soft) [2]       ACTIVITY  As tolerated.  Weight bearing as tolerated    CONSULTATIONS  General Surgery-Dr. Schuler    PROCEDURES  None    LABORATORY  Lab Results   Component Value Date    SODIUM 139 08/26/2024    POTASSIUM 4.3 08/26/2024    CHLORIDE 104 08/26/2024    CO2 22 08/26/2024    GLUCOSE 85 08/26/2024    BUN 8 08/26/2024    CREATININE 0.90 08/26/2024        Lab Results   Component Value Date    WBC 8.0 08/26/2024    HEMOGLOBIN 14.4 08/26/2024    HEMATOCRIT 42.5 08/26/2024    PLATELETCT 275 08/26/2024        Total time of the discharge process exceeds 38 minutes.

## 2024-08-27 ENCOUNTER — TELEPHONE (OUTPATIENT)
Dept: HEALTH INFORMATION MANAGEMENT | Facility: OTHER | Age: 34
End: 2024-08-27
Payer: MEDICAID

## 2024-08-27 LAB
BACTERIA STL CULT: NORMAL
E COLI SXT1+2 STL IA: NORMAL
SIGNIFICANT IND 70042: NORMAL
SITE SITE: NORMAL
SOURCE SOURCE: NORMAL

## 2024-11-14 ENCOUNTER — OFFICE VISIT (OUTPATIENT)
Dept: URGENT CARE | Facility: CLINIC | Age: 34
End: 2024-11-14
Payer: MEDICAID

## 2024-11-14 ENCOUNTER — APPOINTMENT (OUTPATIENT)
Dept: RADIOLOGY | Facility: IMAGING CENTER | Age: 34
End: 2024-11-14
Attending: NURSE PRACTITIONER
Payer: MEDICAID

## 2024-11-14 VITALS
HEIGHT: 73 IN | DIASTOLIC BLOOD PRESSURE: 70 MMHG | BODY MASS INDEX: 35.02 KG/M2 | RESPIRATION RATE: 20 BRPM | TEMPERATURE: 97.6 F | WEIGHT: 264.2 LBS | SYSTOLIC BLOOD PRESSURE: 126 MMHG | HEART RATE: 73 BPM | OXYGEN SATURATION: 95 %

## 2024-11-14 DIAGNOSIS — S69.91XA HAND INJURY, RIGHT, INITIAL ENCOUNTER: ICD-10-CM

## 2024-11-14 DIAGNOSIS — S60.221A CONTUSION OF RIGHT HAND, INITIAL ENCOUNTER: ICD-10-CM

## 2024-11-14 PROCEDURE — 3078F DIAST BP <80 MM HG: CPT | Performed by: NURSE PRACTITIONER

## 2024-11-14 PROCEDURE — 99213 OFFICE O/P EST LOW 20 MIN: CPT | Performed by: NURSE PRACTITIONER

## 2024-11-14 PROCEDURE — 73130 X-RAY EXAM OF HAND: CPT | Mod: TC,RT | Performed by: PHYSICIAN ASSISTANT

## 2024-11-14 PROCEDURE — 3074F SYST BP LT 130 MM HG: CPT | Performed by: NURSE PRACTITIONER

## 2024-11-14 ASSESSMENT — FIBROSIS 4 INDEX: FIB4 SCORE: 0.28

## 2024-11-14 NOTE — PROGRESS NOTES
Chief Complaint   Patient presents with    Hand Injury     Xlast night right hand injury/swelling/painful/punching a table        HISTORY OF PRESENT ILLNESS: Patient is a pleasant 33 y.o. male who presents to urgent care today with concerns of right hand injury.  Patient was intoxicated last night, cannot entirely recall the event but believes he was punching a wooden table.  Today he endorses right hand pain.  He is right-hand dominant.  Denies previous injuries to his hand.  He has not tried medication for centrally.    Patient Active Problem List    Diagnosis Date Noted    Acute diverticulitis of intestine 2024    Chronic midline low back pain without sciatica 2024    Leukocytosis 2024    Muscle weakness 2024    Paresthesia 2024    Dyspnea on exertion 2021    Chest pain 2021    Costochondral chest pain 2021    Marijuana use 2021    Myalgia 2021    Cervicalgia 2021    Sleep disturbance 2021    Headache 2021    Visual disorder 2021    Elbow pain, chronic, unspecified laterality 2017    Grade I hemorrhoids 2017    Eczema 2017    Obesity (BMI 30-39.9) 2017    Bruxism 2017    Genital warts 2015    ADD (attention deficit disorder) 2015       Allergies:Tramadol    No current Epic-ordered outpatient medications on file.     No current [facility]-ordered facility-administered medications on file.       Past Medical History:   Diagnosis Date    ADHD     Anxiety     Chickenpox     Cough     Painful breathing     Shortness of breath     Sputum production     Substance abuse (HCC)     Wheezing        Social History     Tobacco Use    Smoking status: Former     Current packs/day: 0.00     Average packs/day: 0.5 packs/day for 12.0 years (6.0 ttl pk-yrs)     Types: Cigarettes     Start date: 2005     Quit date: 2017     Years since quittin.7    Smokeless tobacco: Never   Vaping Use    Vaping status:  "Some Days    Substances: THC, slight    Devices: Disposable   Substance Use Topics    Alcohol use: Yes     Comment: rare    Drug use: Yes     Types: Marijuana, Inhaled     Comment: marijuana       Family Status   Relation Name Status    Fa  (Not Specified)    Bro  (Not Specified)    MGFa  (Not Specified)    Mo  (Not Specified)    Neg Hx  (Not Specified)   No partnership data on file     Family History   Problem Relation Age of Onset    Psychiatric Illness Father         suicide    Cancer Brother         lung cancer, 43    Alcohol/Drug Brother         meth, heroine    Cancer Maternal Grandfather         lung cancer    Hypertension Mother     Diabetes Neg Hx     Heart Disease Neg Hx        ROS:  Review of Systems   Constitutional: Negative for fever, chills, weight loss, malaise, and fatigue.   HENT: Negative for ear pain, nosebleeds, congestion, sore throat and neck pain.    Eyes: Negative for vision changes.   Neuro: Negative for headache, sensory changes, weakness, seizure, LOC.   Cardiovascular: Negative for chest pain, palpitations, orthopnea and leg swelling.   Respiratory: Negative for cough, sputum production, shortness of breath and wheezing.   Gastrointestinal: Negative for abdominal pain, nausea, vomiting or diarrhea.   Genitourinary: Negative for dysuria, urgency and frequency.  Musculoskeletal: Positive for hand pain.  Negative for falls, neck pain, back pain, joint pain, myalgias.   Skin: Negative for rash, diaphoresis.     Exam:  /70   Pulse 73   Temp 36.4 °C (97.6 °F) (Temporal)   Resp 20   Ht 1.854 m (6' 1\")   Wt 120 kg (264 lb 3.2 oz)   SpO2 95%   General: well-nourished, well-developed male in NAD  Head: normocephalic, atraumatic  Eyes: PERRLA, no conjunctival injection, acuity grossly intact, lids normal.  Ears: normal shape and symmetry, no tenderness, no discharge. External canals are without any significant edema or erythema. Tympanic membranes are without any inflammation, no " "effusion. Gross auditory acuity is intact.  Nose: symmetrical without tenderness, no discharge.  Mouth/Throat: reasonable hygiene, no erythema, exudates or tonsillar enlargement.  Neck: no masses, range of motion within normal limits, no tracheal deviation. No obvious thyroid enlargement.   Lymph: no cervical adenopathy. No supraclavicular adenopathy.   Neuro: alert and oriented. Cranial nerves 1-12 grossly intact. No sensory deficit.   Cardiovascular: regular rate and rhythm. No edema.  Pulmonary: no distress. Chest is symmetrical with respiration, no wheezes, crackles, or rhonchi.   Musculoskeletal: no clubbing, appropriate muscle tone, gait is stable.  Right hand: There is swelling and tenderness noted to dorsal aspect of hand at distal third metatarsal and mid fourth metatarsal.  Fingers have full range of motion, neurovascular intact.  Right wrist is normal appearance, nontender, full range of motion.  Skin: warm, dry, intact, no clubbing, no cyanosis, no rashes.   Psych: appropriate mood, affect, judgement.       DX right hand radiology reading \"1. Post right hand soft tissue swelling.  2. No acute bony or joint abnormality.\"        Assessment/Plan:  1. Contusion of right hand, initial encounter  DX-HAND 3+ RIGHT            Patient presents with right hand injury.  X-ray negative, suspect contusion.  RICE.  OTC NSAIDs. Metacarpal brace placed.   Supportive care, differential diagnoses, and indications for immediate follow-up discussed with patient.   Pathogenesis of diagnosis discussed including typical length and natural progression.   Instructed to return to clinic or nearest emergency department for any change in condition, further concerns, or worsening of symptoms.  Patient states understanding of the plan of care and discharge instructions.  Instructed to make an appointment, for follow up, with his primary care provider.        Please note that this dictation was created using voice recognition " software. I have made every reasonable attempt to correct obvious errors, but I expect that there are errors of grammar and possibly content that I did not discover before finalizing the note.      ZONIA Blanton.

## 2024-12-02 ENCOUNTER — HOSPITAL ENCOUNTER (OUTPATIENT)
Dept: LAB | Facility: MEDICAL CENTER | Age: 34
End: 2024-12-02
Payer: MEDICAID

## 2024-12-02 ENCOUNTER — APPOINTMENT (OUTPATIENT)
Dept: MEDICAL GROUP | Facility: CLINIC | Age: 34
End: 2024-12-02
Payer: MEDICAID

## 2024-12-02 VITALS
BODY MASS INDEX: 35.78 KG/M2 | TEMPERATURE: 97.7 F | HEART RATE: 75 BPM | WEIGHT: 270 LBS | OXYGEN SATURATION: 94 % | SYSTOLIC BLOOD PRESSURE: 115 MMHG | DIASTOLIC BLOOD PRESSURE: 79 MMHG | HEIGHT: 73 IN

## 2024-12-02 DIAGNOSIS — R53.83 OTHER FATIGUE: ICD-10-CM

## 2024-12-02 DIAGNOSIS — Z11.4 SCREENING FOR HIV (HUMAN IMMUNODEFICIENCY VIRUS): ICD-10-CM

## 2024-12-02 DIAGNOSIS — Z31.69 INFERTILITY COUNSELING: ICD-10-CM

## 2024-12-02 DIAGNOSIS — F33.0 MILD EPISODE OF RECURRENT MAJOR DEPRESSIVE DISORDER (HCC): ICD-10-CM

## 2024-12-02 LAB
ALBUMIN SERPL BCP-MCNC: 4.8 G/DL (ref 3.2–4.9)
ALBUMIN/GLOB SERPL: 1.7 G/DL
ALP SERPL-CCNC: 72 U/L (ref 30–99)
ALT SERPL-CCNC: 58 U/L (ref 2–50)
ANION GAP SERPL CALC-SCNC: 13 MMOL/L (ref 7–16)
AST SERPL-CCNC: 30 U/L (ref 12–45)
BASOPHILS # BLD AUTO: 0.9 % (ref 0–1.8)
BASOPHILS # BLD: 0.09 K/UL (ref 0–0.12)
BILIRUB SERPL-MCNC: 0.4 MG/DL (ref 0.1–1.5)
BUN SERPL-MCNC: 18 MG/DL (ref 8–22)
CALCIUM ALBUM COR SERPL-MCNC: 9.1 MG/DL (ref 8.5–10.5)
CALCIUM SERPL-MCNC: 9.7 MG/DL (ref 8.5–10.5)
CHLORIDE SERPL-SCNC: 106 MMOL/L (ref 96–112)
CO2 SERPL-SCNC: 23 MMOL/L (ref 20–33)
CREAT SERPL-MCNC: 1.04 MG/DL (ref 0.5–1.4)
EOSINOPHIL # BLD AUTO: 0.09 K/UL (ref 0–0.51)
EOSINOPHIL NFR BLD: 0.9 % (ref 0–6.9)
ERYTHROCYTE [DISTWIDTH] IN BLOOD BY AUTOMATED COUNT: 41.4 FL (ref 35.9–50)
GFR SERPLBLD CREATININE-BSD FMLA CKD-EPI: 97 ML/MIN/1.73 M 2
GLOBULIN SER CALC-MCNC: 2.8 G/DL (ref 1.9–3.5)
GLUCOSE SERPL-MCNC: 100 MG/DL (ref 65–99)
HCT VFR BLD AUTO: 53 % (ref 42–52)
HGB BLD-MCNC: 17.9 G/DL (ref 14–18)
HIV 1+2 AB+HIV1 P24 AG SERPL QL IA: NORMAL
IMM GRANULOCYTES # BLD AUTO: 0.03 K/UL (ref 0–0.11)
IMM GRANULOCYTES NFR BLD AUTO: 0.3 % (ref 0–0.9)
LYMPHOCYTES # BLD AUTO: 1.76 K/UL (ref 1–4.8)
LYMPHOCYTES NFR BLD: 18.5 % (ref 22–41)
MCH RBC QN AUTO: 29.4 PG (ref 27–33)
MCHC RBC AUTO-ENTMCNC: 33.8 G/DL (ref 32.3–36.5)
MCV RBC AUTO: 87 FL (ref 81.4–97.8)
MONOCYTES # BLD AUTO: 0.8 K/UL (ref 0–0.85)
MONOCYTES NFR BLD AUTO: 8.4 % (ref 0–13.4)
NEUTROPHILS # BLD AUTO: 6.73 K/UL (ref 1.82–7.42)
NEUTROPHILS NFR BLD: 71 % (ref 44–72)
NRBC # BLD AUTO: 0 K/UL
NRBC BLD-RTO: 0 /100 WBC (ref 0–0.2)
PLATELET # BLD AUTO: 328 K/UL (ref 164–446)
PMV BLD AUTO: 10.6 FL (ref 9–12.9)
POTASSIUM SERPL-SCNC: 4.6 MMOL/L (ref 3.6–5.5)
PROT SERPL-MCNC: 7.6 G/DL (ref 6–8.2)
RBC # BLD AUTO: 6.09 M/UL (ref 4.7–6.1)
SODIUM SERPL-SCNC: 142 MMOL/L (ref 135–145)
TESTOST SERPL-MCNC: 394 NG/DL (ref 175–781)
TSH SERPL DL<=0.005 MIU/L-ACNC: 3.36 UIU/ML (ref 0.38–5.33)
WBC # BLD AUTO: 9.5 K/UL (ref 4.8–10.8)

## 2024-12-02 PROCEDURE — 99213 OFFICE O/P EST LOW 20 MIN: CPT | Mod: GC

## 2024-12-02 PROCEDURE — 80053 COMPREHEN METABOLIC PANEL: CPT

## 2024-12-02 PROCEDURE — 87389 HIV-1 AG W/HIV-1&-2 AB AG IA: CPT

## 2024-12-02 PROCEDURE — 3078F DIAST BP <80 MM HG: CPT

## 2024-12-02 PROCEDURE — 84403 ASSAY OF TOTAL TESTOSTERONE: CPT

## 2024-12-02 PROCEDURE — 84443 ASSAY THYROID STIM HORMONE: CPT

## 2024-12-02 PROCEDURE — 36415 COLL VENOUS BLD VENIPUNCTURE: CPT

## 2024-12-02 PROCEDURE — 3074F SYST BP LT 130 MM HG: CPT

## 2024-12-02 PROCEDURE — 85025 COMPLETE CBC W/AUTO DIFF WBC: CPT

## 2024-12-02 ASSESSMENT — PATIENT HEALTH QUESTIONNAIRE - PHQ9
CLINICAL INTERPRETATION OF PHQ2 SCORE: 4
5. POOR APPETITE OR OVEREATING: 0 - NOT AT ALL
SUM OF ALL RESPONSES TO PHQ QUESTIONS 1-9: 7

## 2024-12-02 ASSESSMENT — FIBROSIS 4 INDEX: FIB4 SCORE: 0.28

## 2024-12-02 NOTE — PROGRESS NOTES
Subjective:     CC:   Chief Complaint   Patient presents with    Establish Care     Lab work done  Referral for fertility         HISTORY OF THE PRESENT ILLNESS: Patient is a 33 y.o. male. This pleasant patient is here today to establish care and discuss fertility. His prior PCP was Dr. Montano.    Has a history of diverticulitis early this year. He was hospitalized for IV antibiotics. He is attempting to get more fiber intake and water. He is having bowel movements 2-3 times a day and soft. Has history of chronic back pain and needs to reschedule with pain management for an epidural.     He states that he started vaping again as the nicotine helps him focus. He is only using the vape 25-30 puffs a day. So far his new vape has lasted him 2 weeks and is still 85%. He does not want to quit.     He is currently sexually active with a new partner and actively trying to conceive for the last 6 months. He reports they are having intercourse 2-3 times a week and tracking ovulation. She has seen reproductive endocrinologist that gave her medications to help with ovulation. He has not yet been to an appointment with her. He has had two children of his own. He denies any problems with erection and ejaculation.     Patient states that he has had little motivation, decreased sex drive in the last year, decreased energy and low desire to leave his house. He is concerned that this might be depression but also low testosterone. Has noticed that his depression does worsen around September every year. Does feel better when he goes to the gym, but has been difficult with working at [employer].     Depression Screening    Little interest or pleasure in doing things?  3 - nearly every day  Feeling down, depressed , or hopeless? 1 - several days  Trouble falling or staying asleep, or sleeping too much?  0 - not at all  Feeling tired or having little energy?  2 - more than half the days  Poor appetite or overeating?  0 - not at  all  Feeling bad about yourself - or that you are a failure or have let yourself or your family down? 1 - several days  Trouble concentrating on things, such as reading the newspaper or watching television? 0 - not at all  Moving or speaking so slowly that other people could have noticed.  Or the opposite - being so fidgety or restless that you have been moving around a lot more than usual?  0 - not at all  Thoughts that you would be better off dead, or of hurting yourself?  0 - not at all  Patient Health Questionnaire Score: 7      If depressive symptoms identified deferred to follow up visit unless specifically addressed in assesment and plan.    Interpretation of PHQ-9 Total Score   Score Severity   1-4 No Depression   5-9 Mild Depression   10-14 Moderate Depression   15-19 Moderately Severe Depression   20-27 Severe Depression          No current [facility]-ordered outpatient medications on file.     No current [facility]-ordered facility-administered medications on file.       Family History   Problem Relation Age of Onset    Psychiatric Illness Father         suicide    Cancer Brother         lung cancer, 43    Alcohol/Drug Brother         meth, heroine    Cancer Maternal Grandfather         lung cancer    Hypertension Mother     Diabetes Neg Hx     Heart Disease Neg Hx       History reviewed. No pertinent surgical history.   Social History     Tobacco Use    Smoking status: Former     Current packs/day: 0.00     Average packs/day: 0.5 packs/day for 12.0 years (6.0 ttl pk-yrs)     Types: Cigarettes     Start date: 2005     Quit date: 2017     Years since quittin.8    Smokeless tobacco: Never   Vaping Use    Vaping status: Every Day    Substances: THC, slight    Devices: Disposable   Substance Use Topics    Alcohol use: Yes     Comment: rare    Drug use: Yes     Types: Marijuana, Inhaled     Comment: marijuana        ROS:   Gen: no fevers/chills, no changes in weight  Eyes: no changes in vision  ENT: no changes  "in hearing  Pulm: no sob, no cough  CV: no chest pain, no palpitations  GI: no nausea/vomiting, no diarrhea  MSk: no myalgias  Skin: no rash  Neuro: no headaches, no numbness/tingling      Objective:     Exam: /79 (BP Location: Right arm, Patient Position: Sitting)   Pulse 75   Temp 36.5 °C (97.7 °F)   Ht 1.854 m (6' 1\")   Wt 122 kg (270 lb)   SpO2 94%  Body mass index is 35.62 kg/m².    General: Normal appearing. No distress.  HEENT: Normocephalic. Eyes conjunctiva clear lids without ptosis, pupils equal and reactive to light accommodation, ears normal shape and contour, canals are clear bilaterally, tympanic membranes are benign, nasal mucosa benign, oropharynx is with mild erythema no edema or exudates.   Neck: Supple without JVD or bruit. Thyroid is not enlarged.  Pulmonary: Clear to ausculation.  Normal effort. No rales, ronchi, or wheezing.  Cardiovascular: Regular rate and rhythm without murmur. Carotid and radial pulses are intact and equal bilaterally.  Abdomen: Soft, nontender, nondistended. Liver and spleen are not palpable  Neurologic: Grossly nonfocal  Lymph: No cervical or supraclavicular lymph nodes are palpable  Skin: Warm and dry.  No obvious lesions.  Musculoskeletal: Normal gait. No extremity cyanosis, clubbing, or edema.  Psych: Normal mood and affect. Alert and oriented x3. Judgment and insight is normal.        Assessment & Plan:   33 y.o. male with the following -    Assessment & Plan  Other fatigue  Patient with fatigue and decreased sex drive. Patient currently has mild depression based on PHQ-9 that likely is contributing. Last CBC was completed in August 2024 that was wnl but will repeat to see if anemia is contributing. Has never had a TSH completed, no family history of thyroid disorders and no goiter on exam. Will also complete testosterone due to low sex drive.     Orders:    CBC WITH DIFFERENTIAL; Future    TSH WITH REFLEX TO FT4; Future    Comp Metabolic Panel; Future    " TESTOSTERONE SERUM; Future    Mild episode of recurrent major depressive disorder (HCC)  Discussed with patient results of his PHQ-9 screening. He has mild depression currently. He states that symptoms worsen around September-October annually around the time of his fathers death. He does not have current SI but has had passive thoughts in the past without plan. He states his father committed suicide and would never put his children through that. He would like to start therapy at this time. Discussed resources and making an appointment with Dr. Gastelum while awaiting referral.     Orders:    Referral to Behavioral Health    Infertility counseling  A new partner with whom he has been trying to conceive for the last 6 months.  He has a history of 2 other children with a different partner.  He states that his partner has been to reproductive endocrinology and was given medication for ovulation.  They have been tracking her ovulation and having sex 2-3 times a week.  He is curious if he potentially has any reason that his sperm could be the cause.  Discussed with patient that I will send a referral to reproductive endocrinology as well as discussing with him that he should talk to his partners reproductive endocrinologist for a semen analysis.  Discussed weight loss and cessation of nicotine vaping as well as marijuana to see if this would help.    Orders:    Referral to Endocrinology    Screening for HIV (human immunodeficiency virus)  Low risk individual   Orders:    HIV AG/AB COMBO ASSAY SCREENING; Future          Return in about 2 months (around 2/2/2025).    Mona Hernandez M.D.   PGY2

## 2024-12-21 DIAGNOSIS — Z31.69 INFERTILITY COUNSELING: ICD-10-CM

## 2025-07-13 ENCOUNTER — APPOINTMENT (OUTPATIENT)
Dept: RADIOLOGY | Facility: MEDICAL CENTER | Age: 35
End: 2025-07-13
Attending: EMERGENCY MEDICINE
Payer: MEDICAID

## 2025-07-13 ENCOUNTER — HOSPITAL ENCOUNTER (EMERGENCY)
Facility: MEDICAL CENTER | Age: 35
End: 2025-07-13
Attending: EMERGENCY MEDICINE
Payer: MEDICAID

## 2025-07-13 VITALS
HEIGHT: 73 IN | SYSTOLIC BLOOD PRESSURE: 111 MMHG | BODY MASS INDEX: 32.93 KG/M2 | DIASTOLIC BLOOD PRESSURE: 80 MMHG | RESPIRATION RATE: 18 BRPM | OXYGEN SATURATION: 96 % | TEMPERATURE: 98.1 F | WEIGHT: 248.46 LBS | HEART RATE: 77 BPM

## 2025-07-13 DIAGNOSIS — D72.829 LEUKOCYTOSIS, UNSPECIFIED TYPE: ICD-10-CM

## 2025-07-13 DIAGNOSIS — R10.32 LEFT LOWER QUADRANT ABDOMINAL PAIN: ICD-10-CM

## 2025-07-13 DIAGNOSIS — K57.32 SIGMOID DIVERTICULITIS: Primary | ICD-10-CM

## 2025-07-13 LAB
ALBUMIN SERPL BCP-MCNC: 4.3 G/DL (ref 3.2–4.9)
ALBUMIN/GLOB SERPL: 1.6 G/DL
ALP SERPL-CCNC: 77 U/L (ref 30–99)
ALT SERPL-CCNC: 38 U/L (ref 2–50)
ANION GAP SERPL CALC-SCNC: 12 MMOL/L (ref 7–16)
APPEARANCE UR: CLEAR
AST SERPL-CCNC: 23 U/L (ref 12–45)
BACTERIA #/AREA URNS HPF: ABNORMAL /HPF
BASOPHILS # BLD AUTO: 0.3 % (ref 0–1.8)
BASOPHILS # BLD: 0.04 K/UL (ref 0–0.12)
BILIRUB SERPL-MCNC: 0.8 MG/DL (ref 0.1–1.5)
BILIRUB UR QL STRIP.AUTO: NEGATIVE
BUN SERPL-MCNC: 20 MG/DL (ref 8–22)
CALCIUM ALBUM COR SERPL-MCNC: 9 MG/DL (ref 8.5–10.5)
CALCIUM SERPL-MCNC: 9.2 MG/DL (ref 8.5–10.5)
CASTS URNS QL MICRO: ABNORMAL /LPF (ref 0–2)
CHLORIDE SERPL-SCNC: 105 MMOL/L (ref 96–112)
CO2 SERPL-SCNC: 21 MMOL/L (ref 20–33)
COLOR UR: YELLOW
CREAT SERPL-MCNC: 0.92 MG/DL (ref 0.5–1.4)
EOSINOPHIL # BLD AUTO: 0.05 K/UL (ref 0–0.51)
EOSINOPHIL NFR BLD: 0.4 % (ref 0–6.9)
EPITHELIAL CELLS 1715: ABNORMAL /HPF (ref 0–5)
ERYTHROCYTE [DISTWIDTH] IN BLOOD BY AUTOMATED COUNT: 39.8 FL (ref 35.9–50)
GFR SERPLBLD CREATININE-BSD FMLA CKD-EPI: 112 ML/MIN/1.73 M 2
GLOBULIN SER CALC-MCNC: 2.7 G/DL (ref 1.9–3.5)
GLUCOSE SERPL-MCNC: 138 MG/DL (ref 65–99)
GLUCOSE UR STRIP.AUTO-MCNC: NEGATIVE MG/DL
HCT VFR BLD AUTO: 45.8 % (ref 42–52)
HGB BLD-MCNC: 15.8 G/DL (ref 14–18)
IMM GRANULOCYTES # BLD AUTO: 0.07 K/UL (ref 0–0.11)
IMM GRANULOCYTES NFR BLD AUTO: 0.5 % (ref 0–0.9)
KETONES UR STRIP.AUTO-MCNC: NEGATIVE MG/DL
LEUKOCYTE ESTERASE UR QL STRIP.AUTO: ABNORMAL
LIPASE SERPL-CCNC: 21 U/L (ref 11–82)
LYMPHOCYTES # BLD AUTO: 1.24 K/UL (ref 1–4.8)
LYMPHOCYTES NFR BLD: 9.6 % (ref 22–41)
MCH RBC QN AUTO: 29.9 PG (ref 27–33)
MCHC RBC AUTO-ENTMCNC: 34.5 G/DL (ref 32.3–36.5)
MCV RBC AUTO: 86.6 FL (ref 81.4–97.8)
MICRO URNS: ABNORMAL
MONOCYTES # BLD AUTO: 0.92 K/UL (ref 0–0.85)
MONOCYTES NFR BLD AUTO: 7.1 % (ref 0–13.4)
NEUTROPHILS # BLD AUTO: 10.55 K/UL (ref 1.82–7.42)
NEUTROPHILS NFR BLD: 82.1 % (ref 44–72)
NITRITE UR QL STRIP.AUTO: NEGATIVE
NRBC # BLD AUTO: 0 K/UL
NRBC BLD-RTO: 0 /100 WBC (ref 0–0.2)
PH UR STRIP.AUTO: 6 [PH] (ref 5–8)
PLATELET # BLD AUTO: 326 K/UL (ref 164–446)
PMV BLD AUTO: 9.9 FL (ref 9–12.9)
POTASSIUM SERPL-SCNC: 4 MMOL/L (ref 3.6–5.5)
PROT SERPL-MCNC: 7 G/DL (ref 6–8.2)
PROT UR QL STRIP: NEGATIVE MG/DL
RBC # BLD AUTO: 5.29 M/UL (ref 4.7–6.1)
RBC # URNS HPF: ABNORMAL /HPF
RBC UR QL AUTO: NEGATIVE
SODIUM SERPL-SCNC: 138 MMOL/L (ref 135–145)
SP GR UR STRIP.AUTO: 1.02
UROBILINOGEN UR STRIP.AUTO-MCNC: 1 EU/DL
WBC # BLD AUTO: 12.9 K/UL (ref 4.8–10.8)
WBC #/AREA URNS HPF: ABNORMAL /HPF

## 2025-07-13 PROCEDURE — 700117 HCHG RX CONTRAST REV CODE 255: Performed by: EMERGENCY MEDICINE

## 2025-07-13 PROCEDURE — 85025 COMPLETE CBC W/AUTO DIFF WBC: CPT

## 2025-07-13 PROCEDURE — 80053 COMPREHEN METABOLIC PANEL: CPT

## 2025-07-13 PROCEDURE — 96365 THER/PROPH/DIAG IV INF INIT: CPT

## 2025-07-13 PROCEDURE — 81001 URINALYSIS AUTO W/SCOPE: CPT

## 2025-07-13 PROCEDURE — 700111 HCHG RX REV CODE 636 W/ 250 OVERRIDE (IP): Mod: JZ | Performed by: EMERGENCY MEDICINE

## 2025-07-13 PROCEDURE — 83690 ASSAY OF LIPASE: CPT

## 2025-07-13 PROCEDURE — 36415 COLL VENOUS BLD VENIPUNCTURE: CPT

## 2025-07-13 PROCEDURE — 74177 CT ABD & PELVIS W/CONTRAST: CPT

## 2025-07-13 PROCEDURE — 96375 TX/PRO/DX INJ NEW DRUG ADDON: CPT

## 2025-07-13 PROCEDURE — 99285 EMERGENCY DEPT VISIT HI MDM: CPT

## 2025-07-13 PROCEDURE — 700105 HCHG RX REV CODE 258: Performed by: EMERGENCY MEDICINE

## 2025-07-13 RX ORDER — MORPHINE SULFATE 4 MG/ML
4 INJECTION INTRAVENOUS ONCE
Status: COMPLETED | OUTPATIENT
Start: 2025-07-13 | End: 2025-07-13

## 2025-07-13 RX ORDER — ONDANSETRON 2 MG/ML
4 INJECTION INTRAMUSCULAR; INTRAVENOUS ONCE
Status: COMPLETED | OUTPATIENT
Start: 2025-07-13 | End: 2025-07-13

## 2025-07-13 RX ORDER — HYDROCODONE BITARTRATE AND ACETAMINOPHEN 5; 325 MG/1; MG/1
1-2 TABLET ORAL EVERY 6 HOURS PRN
Qty: 10 TABLET | Refills: 0 | Status: SHIPPED | OUTPATIENT
Start: 2025-07-13 | End: 2025-07-18

## 2025-07-13 RX ADMIN — ONDANSETRON 4 MG: 2 INJECTION INTRAMUSCULAR; INTRAVENOUS at 17:08

## 2025-07-13 RX ADMIN — IOHEXOL 100 ML: 350 INJECTION, SOLUTION INTRAVENOUS at 17:10

## 2025-07-13 RX ADMIN — AMPICILLIN AND SULBACTAM 3 G: 1; 2 INJECTION, POWDER, FOR SOLUTION INTRAMUSCULAR; INTRAVENOUS at 18:20

## 2025-07-13 RX ADMIN — MORPHINE SULFATE 4 MG: 4 INJECTION INTRAVENOUS at 17:08

## 2025-07-13 ASSESSMENT — PAIN DESCRIPTION - PAIN TYPE: TYPE: ACUTE PAIN

## 2025-07-13 ASSESSMENT — FIBROSIS 4 INDEX: FIB4 SCORE: 0.41

## 2025-07-13 NOTE — ED TRIAGE NOTES
"Chief Complaint   Patient presents with    Abdominal Pain     LLQ pain that started this morning. He has had symptoms like this in the past - diagnosed with diverticulitis. Unknown fevers. Soft Bms the last few weeks but that's normal for him. No constipation. Mild bladder pain when urinating, no dysuria.       /66   Pulse 93   Temp 36.7 °C (98 °F) (Temporal)   Resp 18   Ht 1.854 m (6' 1\")   Wt 113 kg (248 lb 7.3 oz)   SpO2 96%   BMI 32.78 kg/m²     "

## 2025-07-13 NOTE — ED NOTES
IV established in triage, blood work drawn and taken to lab  Pt educated on removal of IV prior to leaving ER

## 2025-07-14 NOTE — ED PROVIDER NOTES
ED Provider Note    CHIEF COMPLAINT  Chief Complaint   Patient presents with    Abdominal Pain     LLQ pain that started this morning. He has had symptoms like this in the past - diagnosed with diverticulitis. Unknown fevers. Soft Bms the last few weeks but that's normal for him. No constipation. Mild bladder pain when urinating, no dysuria.         EXTERNAL RECORDS REVIEWED  PDMP no active prescriptions for narcotic or sedative    HPI/ROS  LIMITATION TO HISTORY   Select: : None  OUTSIDE HISTORIAN(S):  None    Miky Dav Khanna is a 34 y.o. male who presents with left lower quadrant abdominal pain, worse with movement or touch.  He stated he had left flank pain 2 days ago, this however is now resolved.  No trauma.  Patient was hospitalized last year for perforated diverticulitis, states he spent a week in the hospital.  Pain feels similar today.  No testicular pain, no dysuria.  He denies nausea or vomiting.  He has had a poor appetite today.  No chest pain or difficulties breathing.    PAST MEDICAL HISTORY   has a past medical history of ADHD, Anxiety, Chickenpox, Cough, Painful breathing, Shortness of breath, Sputum production, Substance abuse (HCC), and Wheezing.    SURGICAL HISTORY  patient denies any surgical history    FAMILY HISTORY  Family History   Problem Relation Age of Onset    Psychiatric Illness Father         suicide    Cancer Brother         lung cancer, 43    Alcohol/Drug Brother         meth, heroine    Cancer Maternal Grandfather         lung cancer    Hypertension Mother     Diabetes Neg Hx     Heart Disease Neg Hx        SOCIAL HISTORY  Social History     Tobacco Use    Smoking status: Former     Current packs/day: 0.00     Average packs/day: 0.5 packs/day for 12.0 years (6.0 ttl pk-yrs)     Types: Cigarettes     Start date: 2005     Quit date: 2017     Years since quittin.4    Smokeless tobacco: Never   Vaping Use    Vaping status: Every Day    Substances: Nicotine, THC, slight     "Devices: Disposable   Substance and Sexual Activity    Alcohol use: Yes     Comment: rare    Drug use: Yes     Types: Marijuana, Inhaled     Comment: marijuana    Sexual activity: Yes     Partners: Female     Comment: no STD, two children       CURRENT MEDICATIONS  Home Medications    **Home medications have not yet been reviewed for this encounter**         ALLERGIES  Allergies[1]    PHYSICAL EXAM  VITAL SIGNS: /66   Pulse 93   Temp 36.7 °C (98 °F) (Temporal)   Resp 18   Ht 1.854 m (6' 1\")   Wt 113 kg (248 lb 7.3 oz)   SpO2 96%   BMI 32.78 kg/m²    GI: Left lower quadrant tenderness, mild guarding.  Right lower quadrant and upper abdomen are soft and nontender  Musculoskeletal: No flank tenderness  Psychiatric: Normal mood  Neurologic: Strength intact  Eyes: No scleral icterus  ENT: Mucous members are moist  Cardiac: Regular rate, regular rhythm  Respiratory: Clear lung sounds    EKG/LABS  Results for orders placed or performed during the hospital encounter of 07/13/25   CBC WITH DIFFERENTIAL    Collection Time: 07/13/25  3:18 PM   Result Value Ref Range    WBC 12.9 (H) 4.8 - 10.8 K/uL    RBC 5.29 4.70 - 6.10 M/uL    Hemoglobin 15.8 14.0 - 18.0 g/dL    Hematocrit 45.8 42.0 - 52.0 %    MCV 86.6 81.4 - 97.8 fL    MCH 29.9 27.0 - 33.0 pg    MCHC 34.5 32.3 - 36.5 g/dL    RDW 39.8 35.9 - 50.0 fL    Platelet Count 326 164 - 446 K/uL    MPV 9.9 9.0 - 12.9 fL    Neutrophils-Polys 82.10 (H) 44.00 - 72.00 %    Lymphocytes 9.60 (L) 22.00 - 41.00 %    Monocytes 7.10 0.00 - 13.40 %    Eosinophils 0.40 0.00 - 6.90 %    Basophils 0.30 0.00 - 1.80 %    Immature Granulocytes 0.50 0.00 - 0.90 %    Nucleated RBC 0.00 0.00 - 0.20 /100 WBC    Neutrophils (Absolute) 10.55 (H) 1.82 - 7.42 K/uL    Lymphs (Absolute) 1.24 1.00 - 4.80 K/uL    Monos (Absolute) 0.92 (H) 0.00 - 0.85 K/uL    Eos (Absolute) 0.05 0.00 - 0.51 K/uL    Baso (Absolute) 0.04 0.00 - 0.12 K/uL    Immature Granulocytes (abs) 0.07 0.00 - 0.11 K/uL    NRBC " (Absolute) 0.00 K/uL   COMP METABOLIC PANEL    Collection Time: 07/13/25  3:18 PM   Result Value Ref Range    Sodium 138 135 - 145 mmol/L    Potassium 4.0 3.6 - 5.5 mmol/L    Chloride 105 96 - 112 mmol/L    Co2 21 20 - 33 mmol/L    Anion Gap 12.0 7.0 - 16.0    Glucose 138 (H) 65 - 99 mg/dL    Bun 20 8 - 22 mg/dL    Creatinine 0.92 0.50 - 1.40 mg/dL    Calcium 9.2 8.5 - 10.5 mg/dL    Correct Calcium 9.0 8.5 - 10.5 mg/dL    AST(SGOT) 23 12 - 45 U/L    ALT(SGPT) 38 2 - 50 U/L    Alkaline Phosphatase 77 30 - 99 U/L    Total Bilirubin 0.8 0.1 - 1.5 mg/dL    Albumin 4.3 3.2 - 4.9 g/dL    Total Protein 7.0 6.0 - 8.2 g/dL    Globulin 2.7 1.9 - 3.5 g/dL    A-G Ratio 1.6 g/dL   LIPASE    Collection Time: 07/13/25  3:18 PM   Result Value Ref Range    Lipase 21 11 - 82 U/L   ESTIMATED GFR    Collection Time: 07/13/25  3:18 PM   Result Value Ref Range    GFR (CKD-EPI) 112 >60 mL/min/1.73 m 2   URINALYSIS    Collection Time: 07/13/25  4:30 PM    Specimen: Urine   Result Value Ref Range    Color Yellow     Character Clear     Specific Gravity 1.025 <1.035    Ph 6.0 5.0 - 8.0    Glucose Negative Negative mg/dL    Ketones Negative Negative mg/dL    Protein Negative Negative mg/dL    Bilirubin Negative Negative    Urobilinogen, Urine 1.0 <=1.0 EU/dL    Nitrite Negative Negative    Leukocyte Esterase Trace (A) Negative    Occult Blood Negative Negative    Micro Urine Req Microscopic    URINE MICROSCOPIC (W/UA)    Collection Time: 07/13/25  4:30 PM   Result Value Ref Range    WBC 6-10 (A) /hpf    RBC 0-2 /hpf    Bacteria None Seen None /hpf    Epithelial Cells 0-2 0 - 5 /hpf    Urine Casts 0-2 0 - 2 /lpf       I have independently interpreted this EKG    RADIOLOGY/PROCEDURES   I have independently interpreted the diagnostic imaging associated with this visit and am waiting the final reading from the radiologist.   My preliminary interpretation is as follows: CT scan of the abdomen pelvis negative for free air, no stone, no bowel  obstruction.  Inflammatory change of the sigmoid colon noted    Radiologist interpretation:  CT-ABDOMEN-PELVIS WITH   Final Result      1.  Findings are consistent with diverticulitis involving the proximal sigmoid colon.      2.  No abscess or free air identified.      3.  Hepatic steatosis.             COURSE & MEDICAL DECISION MAKING    ASSESSMENT, COURSE AND PLAN  Care Narrative: Patient presents with left lower quadrant abdominal pain, previously with left flank pain.  His urinalysis is negative for severe infection.  There is trace leukocyte esterase and 6-10 white blood cells.  Patient will be placed on Augmentin for coverage of diverticulitis seen on his CT scan.  This should cover mild UTI if this is part of his symptoms today.  Lacking flank pain today, he does not have pyelonephritis at this time.  Patient was given initial dose of IV Unasyn.  He does have elevated white blood cell count however stable vital signs, afebrile, he does not appear septic.  As he is otherwise young and healthy, patient is a good candidate for outpatient treatment with antibiotics.  Given the patient's history of prior diverticular perforation as well as presenting with leukocytosis, he was not a candidate for conservative treatment without antibiotics at this time.  Patient is advised to follow-up with his doctor within the next week if no improvement to return if worse.    ADDITIONAL PROBLEMS MANAGED  Patient has requested prescription narcotic pain medicine.  I did review his history,, therefore prescription is limited to 10 tablets.  He is advised not to take this medication while working or driving.  Patient is agreeable to these restrictions.    DISPOSITION AND DISCUSSIONS    Escalation of care considered, and ultimately not performed:acute inpatient care management, however at this time, the patient is most appropriate for outpatient management      FINAL DIAGNOSIS  1. Sigmoid diverticulitis    2. Left lower quadrant  abdominal pain    3. Leukocytosis, unspecified type         Electronically signed by: David Wheatley M.D., 7/13/2025 5:00 PM           [1]   Allergies  Allergen Reactions    Avocado Rash     Itchy throat     Banana Rash     Itchy throat     Tramadol Unspecified     Acid refulx

## 2025-07-14 NOTE — DISCHARGE INSTRUCTIONS
See your doctor for recheck in 1 week if no improvement, sooner if worse or return to the emergency department.

## 2025-07-14 NOTE — ED NOTES
Patient is stable for discharge at this time, anticipatory guidance provided, close follow-up is encouraged, and ED return instructions have been detailed. Patient and friend are both agreeable to the disposition and plan and discharged home in ambulatory state and in good condition.     Rx education provided, Pt verbalized understanding;  Narcotic paper signed by Pt;    Pt friend driving him home;